# Patient Record
Sex: MALE | Race: WHITE
[De-identification: names, ages, dates, MRNs, and addresses within clinical notes are randomized per-mention and may not be internally consistent; named-entity substitution may affect disease eponyms.]

---

## 2018-08-27 ENCOUNTER — HOSPITAL ENCOUNTER (OUTPATIENT)
Dept: HOSPITAL 94 - GI LAB | Age: 58
Discharge: HOME | End: 2018-08-27
Attending: INTERNAL MEDICINE
Payer: MEDICAID

## 2018-08-27 VITALS — DIASTOLIC BLOOD PRESSURE: 87 MMHG | SYSTOLIC BLOOD PRESSURE: 139 MMHG

## 2018-08-27 VITALS — DIASTOLIC BLOOD PRESSURE: 88 MMHG | SYSTOLIC BLOOD PRESSURE: 146 MMHG

## 2018-08-27 VITALS — DIASTOLIC BLOOD PRESSURE: 74 MMHG | SYSTOLIC BLOOD PRESSURE: 113 MMHG

## 2018-08-27 VITALS — HEIGHT: 68 IN | WEIGHT: 171.37 LBS | BODY MASS INDEX: 25.97 KG/M2

## 2018-08-27 VITALS — DIASTOLIC BLOOD PRESSURE: 78 MMHG | SYSTOLIC BLOOD PRESSURE: 126 MMHG

## 2018-08-27 VITALS — DIASTOLIC BLOOD PRESSURE: 90 MMHG | SYSTOLIC BLOOD PRESSURE: 137 MMHG

## 2018-08-27 DIAGNOSIS — B37.81: Primary | ICD-10-CM

## 2018-08-27 DIAGNOSIS — K76.6: ICD-10-CM

## 2018-08-27 DIAGNOSIS — I85.00: ICD-10-CM

## 2018-08-27 DIAGNOSIS — Z79.899: ICD-10-CM

## 2018-08-27 DIAGNOSIS — E11.9: ICD-10-CM

## 2018-08-27 DIAGNOSIS — Z79.4: ICD-10-CM

## 2018-08-27 DIAGNOSIS — K31.89: ICD-10-CM

## 2018-08-27 DIAGNOSIS — L40.8: ICD-10-CM

## 2018-08-27 DIAGNOSIS — Z98.890: ICD-10-CM

## 2018-08-27 DIAGNOSIS — Z87.891: ICD-10-CM

## 2018-08-27 DIAGNOSIS — D50.0: ICD-10-CM

## 2018-08-27 PROCEDURE — 99152 MOD SED SAME PHYS/QHP 5/>YRS: CPT

## 2018-08-27 PROCEDURE — 43239 EGD BIOPSY SINGLE/MULTIPLE: CPT

## 2019-03-01 ENCOUNTER — HOSPITAL ENCOUNTER (INPATIENT)
Dept: HOSPITAL 94 - ER | Age: 59
LOS: 9 days | Discharge: LEFT BEFORE DISCHARGE | End: 2019-03-10
Payer: MEDICAID

## 2019-03-01 DIAGNOSIS — K72.00: ICD-10-CM

## 2019-03-01 DIAGNOSIS — L03.90: ICD-10-CM

## 2019-03-01 DIAGNOSIS — K74.60: ICD-10-CM

## 2019-03-01 DIAGNOSIS — A41.9: Primary | ICD-10-CM

## 2019-03-18 ENCOUNTER — HOSPITAL ENCOUNTER (INPATIENT)
Dept: HOSPITAL 94 - ER | Age: 59
LOS: 15 days | Discharge: NURSING, REHAB OR LONG-TERM CARE | DRG: 720 | End: 2019-04-02
Attending: GENERAL PRACTICE | Admitting: SPECIALIST
Payer: MEDICAID

## 2019-03-18 VITALS — BODY MASS INDEX: 25.06 KG/M2 | HEIGHT: 68 IN | WEIGHT: 165.35 LBS

## 2019-03-18 VITALS — SYSTOLIC BLOOD PRESSURE: 115 MMHG | DIASTOLIC BLOOD PRESSURE: 63 MMHG

## 2019-03-18 DIAGNOSIS — K52.9: ICD-10-CM

## 2019-03-18 DIAGNOSIS — Z79.4: ICD-10-CM

## 2019-03-18 DIAGNOSIS — I89.0: ICD-10-CM

## 2019-03-18 DIAGNOSIS — E87.6: ICD-10-CM

## 2019-03-18 DIAGNOSIS — Z60.2: ICD-10-CM

## 2019-03-18 DIAGNOSIS — F10.10: ICD-10-CM

## 2019-03-18 DIAGNOSIS — L03.116: ICD-10-CM

## 2019-03-18 DIAGNOSIS — L03.115: ICD-10-CM

## 2019-03-18 DIAGNOSIS — Z87.891: ICD-10-CM

## 2019-03-18 DIAGNOSIS — M99.06: ICD-10-CM

## 2019-03-18 DIAGNOSIS — R14.0: ICD-10-CM

## 2019-03-18 DIAGNOSIS — Z59.0: ICD-10-CM

## 2019-03-18 DIAGNOSIS — Z51.5: ICD-10-CM

## 2019-03-18 DIAGNOSIS — D69.6: ICD-10-CM

## 2019-03-18 DIAGNOSIS — K42.9: ICD-10-CM

## 2019-03-18 DIAGNOSIS — B19.20: ICD-10-CM

## 2019-03-18 DIAGNOSIS — K70.31: ICD-10-CM

## 2019-03-18 DIAGNOSIS — K72.90: ICD-10-CM

## 2019-03-18 DIAGNOSIS — E11.9: ICD-10-CM

## 2019-03-18 DIAGNOSIS — A41.51: Primary | ICD-10-CM

## 2019-03-18 DIAGNOSIS — B35.1: ICD-10-CM

## 2019-03-18 LAB
ALBUMIN SERPL BCP-MCNC: 1.5 G/DL (ref 3.4–5)
ALBUMIN/GLOB SERPL: 0.3 {RATIO} (ref 1.1–1.5)
ALP SERPL-CCNC: 125 IU/L (ref 46–116)
ALT SERPL W P-5'-P-CCNC: 54 U/L (ref 12–78)
ANION GAP SERPL CALCULATED.3IONS-SCNC: 8 MMOL/L (ref 8–16)
APTT PPP: 40 SECONDS (ref 22–32)
AST SERPL W P-5'-P-CCNC: 67 U/L (ref 10–37)
BASOPHILS # BLD AUTO: 0 X10'3 (ref 0–0.2)
BASOPHILS NFR BLD AUTO: 0.2 % (ref 0–1)
BILIRUB SERPL-MCNC: 5.9 MG/DL (ref 0.1–1)
BUN SERPL-MCNC: 12 MG/DL (ref 7–18)
BUN/CREAT SERPL: 14.1 (ref 5.4–32)
CALCIUM SERPL-MCNC: 7.3 MG/DL (ref 8.5–10.1)
CHLORIDE SERPL-SCNC: 101 MMOL/L (ref 99–107)
CO2 SERPL-SCNC: 20.7 MMOL/L (ref 24–32)
CREAT SERPL-MCNC: 0.85 MG/DL (ref 0.6–1.1)
EOSINOPHIL # BLD AUTO: 0 X10'3 (ref 0–0.9)
EOSINOPHIL NFR BLD AUTO: 0.1 % (ref 0–6)
ERYTHROCYTE [DISTWIDTH] IN BLOOD BY AUTOMATED COUNT: 17.3 % (ref 11.5–14.5)
GFR SERPL CREATININE-BSD FRML MDRD: > 90 ML/MIN
GLUCOSE SERPL-MCNC: 122 MG/DL (ref 70–104)
HCT VFR BLD AUTO: 31 % (ref 42–52)
HGB BLD-MCNC: 10.9 G/DL (ref 14–17.9)
INR PPP: 2 INR
LYMPHOCYTES # BLD AUTO: 0.6 X10'3 (ref 1.1–4.8)
LYMPHOCYTES NFR BLD AUTO: 8 % (ref 21–51)
MCH RBC QN AUTO: 36.5 PG (ref 27–31)
MCHC RBC AUTO-ENTMCNC: 35.1 G/DL (ref 33–36.5)
MCV RBC AUTO: 103.8 FL (ref 78–98)
MONOCYTES # BLD AUTO: 0.2 X10'3 (ref 0–0.9)
MONOCYTES NFR BLD AUTO: 3.3 % (ref 2–12)
NEUTROPHILS # BLD AUTO: 6.1 X10'3 (ref 1.8–7.7)
NEUTROPHILS NFR BLD AUTO: 88.4 % (ref 42–75)
PLATELET # BLD AUTO: 106 X10'3 (ref 140–440)
PMV BLD AUTO: 8 FL (ref 7.4–10.4)
POTASSIUM SERPL-SCNC: 3.1 MMOL/L (ref 3.5–5.1)
PROT SERPL-MCNC: 6.1 G/DL (ref 6.4–8.2)
PROTHROMBIN TIME: 19.7 SECONDS (ref 9–12)
RBC # BLD AUTO: 2.98 X10'6 (ref 4.7–6.1)
SODIUM SERPL-SCNC: 130 MMOL/L (ref 135–145)
WBC # BLD AUTO: 6.9 X10'3 (ref 4.5–11)

## 2019-03-18 PROCEDURE — 86703 HIV-1/HIV-2 1 RESULT ANTBDY: CPT

## 2019-03-18 PROCEDURE — 87070 CULTURE OTHR SPECIMN AEROBIC: CPT

## 2019-03-18 PROCEDURE — 80074 ACUTE HEPATITIS PANEL: CPT

## 2019-03-18 PROCEDURE — 96361 HYDRATE IV INFUSION ADD-ON: CPT

## 2019-03-18 PROCEDURE — 87040 BLOOD CULTURE FOR BACTERIA: CPT

## 2019-03-18 PROCEDURE — 97530 THERAPEUTIC ACTIVITIES: CPT

## 2019-03-18 PROCEDURE — 85730 THROMBOPLASTIN TIME PARTIAL: CPT

## 2019-03-18 PROCEDURE — 97116 GAIT TRAINING THERAPY: CPT

## 2019-03-18 PROCEDURE — 96365 THER/PROPH/DIAG IV INF INIT: CPT

## 2019-03-18 PROCEDURE — 83605 ASSAY OF LACTIC ACID: CPT

## 2019-03-18 PROCEDURE — 76705 ECHO EXAM OF ABDOMEN: CPT

## 2019-03-18 PROCEDURE — 71045 X-RAY EXAM CHEST 1 VIEW: CPT

## 2019-03-18 PROCEDURE — 74177 CT ABD & PELVIS W/CONTRAST: CPT

## 2019-03-18 PROCEDURE — 93306 TTE W/DOPPLER COMPLETE: CPT

## 2019-03-18 PROCEDURE — 76700 US EXAM ABDOM COMPLETE: CPT

## 2019-03-18 PROCEDURE — 82948 REAGENT STRIP/BLOOD GLUCOSE: CPT

## 2019-03-18 PROCEDURE — 85610 PROTHROMBIN TIME: CPT

## 2019-03-18 PROCEDURE — 85025 COMPLETE CBC W/AUTO DIFF WBC: CPT

## 2019-03-18 PROCEDURE — 99285 EMERGENCY DEPT VISIT HI MDM: CPT

## 2019-03-18 PROCEDURE — 82378 CARCINOEMBRYONIC ANTIGEN: CPT

## 2019-03-18 PROCEDURE — 84145 PROCALCITONIN (PCT): CPT

## 2019-03-18 PROCEDURE — 87186 SC STD MICRODIL/AGAR DIL: CPT

## 2019-03-18 PROCEDURE — 97161 PT EVAL LOW COMPLEX 20 MIN: CPT

## 2019-03-18 PROCEDURE — 36415 COLL VENOUS BLD VENIPUNCTURE: CPT

## 2019-03-18 PROCEDURE — 80053 COMPREHEN METABOLIC PANEL: CPT

## 2019-03-18 PROCEDURE — 82140 ASSAY OF AMMONIA: CPT

## 2019-03-18 PROCEDURE — 87077 CULTURE AEROBIC IDENTIFY: CPT

## 2019-03-18 RX ADMIN — Medication SCH MLS/HR: at 23:23

## 2019-03-18 SDOH — ECONOMIC STABILITY - HOUSING INSECURITY: HOMELESSNESS: Z59.0

## 2019-03-18 SDOH — SOCIAL STABILITY - SOCIAL INSECURITY: PROBLEMS RELATED TO LIVING ALONE: Z60.2

## 2019-03-18 NOTE — NUR
PATIENT HAD ELEVATED LACTIC ACID, VERBALIZED TO DR. JAMIL THAT THE NS BOLUS 
WAS GOING SLOW. DR. JAMIL VERBALIZED THAT THIS IS GOING TO BE FINE THAT WE 
NEED TO SAVE IV DUE TO PATIENT BEING HARD STICK, AND THAT THE LEVEL OF SEPSIS 
DR. JAMIL IS NOT CONCERNED FOR THE 30mL PER KG.

## 2019-03-18 NOTE — NUR
TO ER WITH C/O LOWER EXTREM EDEMA AND PAIN. 3-4+ EDEMA NOTED TO LOWER EXTREMS. 
REDNESS AND EXCORIATION TO RIGHT LOWER LEG AND RIGHT THIGH. DIFFICULTY MOVING 
AROUND. POOR HISTORIAN[place]

## 2019-03-19 VITALS — DIASTOLIC BLOOD PRESSURE: 57 MMHG | SYSTOLIC BLOOD PRESSURE: 107 MMHG

## 2019-03-19 VITALS — DIASTOLIC BLOOD PRESSURE: 59 MMHG | SYSTOLIC BLOOD PRESSURE: 103 MMHG

## 2019-03-19 VITALS — SYSTOLIC BLOOD PRESSURE: 126 MMHG | DIASTOLIC BLOOD PRESSURE: 62 MMHG

## 2019-03-19 LAB
ALBUMIN SERPL BCP-MCNC: 1.2 G/DL (ref 3.4–5)
ALBUMIN/GLOB SERPL: 0.3 {RATIO} (ref 1.1–1.5)
ALP SERPL-CCNC: 99 IU/L (ref 46–116)
ALT SERPL W P-5'-P-CCNC: 42 U/L (ref 12–78)
ANION GAP SERPL CALCULATED.3IONS-SCNC: 9 MMOL/L (ref 8–16)
AST SERPL W P-5'-P-CCNC: 50 U/L (ref 10–37)
BASOPHILS # BLD AUTO: 0 X10'3 (ref 0–0.2)
BASOPHILS NFR BLD AUTO: 0.2 % (ref 0–1)
BILIRUB SERPL-MCNC: 4.9 MG/DL (ref 0.1–1)
BUN SERPL-MCNC: 15 MG/DL (ref 7–18)
BUN/CREAT SERPL: 17.4 (ref 5.4–32)
CALCIUM SERPL-MCNC: 7.2 MG/DL (ref 8.5–10.1)
CHLORIDE SERPL-SCNC: 103 MMOL/L (ref 99–107)
CO2 SERPL-SCNC: 21 MMOL/L (ref 24–32)
CREAT SERPL-MCNC: 0.86 MG/DL (ref 0.6–1.1)
EOSINOPHIL # BLD AUTO: 0.1 X10'3 (ref 0–0.9)
EOSINOPHIL NFR BLD AUTO: 1 % (ref 0–6)
ERYTHROCYTE [DISTWIDTH] IN BLOOD BY AUTOMATED COUNT: 17.6 % (ref 11.5–14.5)
GFR SERPL CREATININE-BSD FRML MDRD: > 90 ML/MIN
GLUCOSE SERPL-MCNC: 132 MG/DL (ref 70–104)
HCT VFR BLD AUTO: 27.6 % (ref 42–52)
HGB BLD-MCNC: 9.8 G/DL (ref 14–17.9)
HIV1+2 AB SPEC QL IA.RAPID: (no result)
LYMPHOCYTES # BLD AUTO: 0.8 X10'3 (ref 1.1–4.8)
LYMPHOCYTES NFR BLD AUTO: 13.2 % (ref 21–51)
MCH RBC QN AUTO: 37 PG (ref 27–31)
MCHC RBC AUTO-ENTMCNC: 35.6 G/DL (ref 33–36.5)
MCV RBC AUTO: 104.1 FL (ref 78–98)
MONOCYTES # BLD AUTO: 0.4 X10'3 (ref 0–0.9)
MONOCYTES NFR BLD AUTO: 6.6 % (ref 2–12)
NEUTROPHILS # BLD AUTO: 4.9 X10'3 (ref 1.8–7.7)
NEUTROPHILS NFR BLD AUTO: 79 % (ref 42–75)
PLATELET # BLD AUTO: 85 X10'3 (ref 140–440)
PMV BLD AUTO: 8 FL (ref 7.4–10.4)
POTASSIUM SERPL-SCNC: 3.1 MMOL/L (ref 3.5–5.1)
PROT SERPL-MCNC: 5.1 G/DL (ref 6.4–8.2)
RBC # BLD AUTO: 2.65 X10'6 (ref 4.7–6.1)
SODIUM SERPL-SCNC: 133 MMOL/L (ref 135–145)
WBC # BLD AUTO: 6.2 X10'3 (ref 4.5–11)

## 2019-03-19 PROCEDURE — BW211ZZ COMPUTERIZED TOMOGRAPHY (CT SCAN) OF ABDOMEN AND PELVIS USING LOW OSMOLAR CONTRAST: ICD-10-PCS | Performed by: RADIOLOGY

## 2019-03-19 RX ADMIN — MORPHINE SULFATE PRN MG: 4 INJECTION, SOLUTION INTRAMUSCULAR; INTRAVENOUS at 19:38

## 2019-03-19 RX ADMIN — SODIUM CHLORIDE SCH GM: 0.9 IRRIGANT IRRIGATION at 19:27

## 2019-03-19 RX ADMIN — SODIUM CHLORIDE SCH GM: 0.9 IRRIGANT IRRIGATION at 14:00

## 2019-03-19 RX ADMIN — MORPHINE SULFATE PRN MG: 4 INJECTION, SOLUTION INTRAMUSCULAR; INTRAVENOUS at 07:35

## 2019-03-19 RX ADMIN — Medication SCH MMU: at 19:28

## 2019-03-19 RX ADMIN — POTASSIUM CHLORIDE PRN MEQ: 1500 TABLET, EXTENDED RELEASE ORAL at 11:47

## 2019-03-19 RX ADMIN — CEFEPIME SCH MLS/HR: 1 INJECTION, SOLUTION INTRAVENOUS at 14:00

## 2019-03-19 RX ADMIN — CEFEPIME SCH MLS/HR: 1 INJECTION, SOLUTION INTRAVENOUS at 19:28

## 2019-03-19 RX ADMIN — POTASSIUM CHLORIDE PRN MEQ: 1500 TABLET, EXTENDED RELEASE ORAL at 16:55

## 2019-03-19 RX ADMIN — INSULIN GLARGINE SCH UNIT: 100 INJECTION, SOLUTION SUBCUTANEOUS at 20:56

## 2019-03-19 RX ADMIN — SODIUM CHLORIDE SCH GM: 0.9 IRRIGANT IRRIGATION at 20:00

## 2019-03-19 RX ADMIN — MORPHINE SULFATE PRN MG: 4 INJECTION, SOLUTION INTRAMUSCULAR; INTRAVENOUS at 03:14

## 2019-03-19 RX ADMIN — MORPHINE SULFATE PRN MG: 4 INJECTION, SOLUTION INTRAMUSCULAR; INTRAVENOUS at 12:08

## 2019-03-19 RX ADMIN — Medication SCH MLS/HR: at 07:34

## 2019-03-19 NOTE — NUR
Problems reprioritized. Patient report given, questions answered & plan of care reviewed 
with Chyna KELLY.

## 2019-03-19 NOTE — NUR
MD paged regarding positive blood culture in anareobic bottle, gram negative rods. Awaiting 
call back.

## 2019-03-19 NOTE — NUR
Extended PIV inserted to the left upper arm basilic vein x 1 attempt ussing ultrasound.  Michael 
well.

-------------------------------------------------------------------------------

Addendum: 03/19/19 at 1556 by Sandra Calderon RN

-------------------------------------------------------------------------------

Amended: Links added.

## 2019-03-19 NOTE — NUR
Patient in room [facility] 357. I have received report from  Chyna KELLY  and had the opportunity to 
ask questions and assume patient care.

## 2019-03-19 NOTE — NUR
Dr. Christian informed of positive blood culture from IV 3/18 gram negative rods. No new 
orders. Also informed MD of K 3.1, MD stated to replace per protocol.

## 2019-03-19 NOTE — NUR
Problems reprioritized. Patient report given, questions answered & plan of care reviewed 
with ROBIN Erazo. 

-------------------------------------------------------------------------------

Addendum: 03/19/19 at 0632 by Chyna Bosch RN

-------------------------------------------------------------------------------

Amended: Links added.

## 2019-03-20 VITALS — SYSTOLIC BLOOD PRESSURE: 123 MMHG | DIASTOLIC BLOOD PRESSURE: 75 MMHG

## 2019-03-20 VITALS — SYSTOLIC BLOOD PRESSURE: 106 MMHG | DIASTOLIC BLOOD PRESSURE: 65 MMHG

## 2019-03-20 VITALS — SYSTOLIC BLOOD PRESSURE: 123 MMHG | DIASTOLIC BLOOD PRESSURE: 58 MMHG

## 2019-03-20 VITALS — SYSTOLIC BLOOD PRESSURE: 128 MMHG | DIASTOLIC BLOOD PRESSURE: 69 MMHG

## 2019-03-20 LAB
ALBUMIN SERPL BCP-MCNC: 1.2 G/DL (ref 3.4–5)
ALBUMIN/GLOB SERPL: 0.3 {RATIO} (ref 1.1–1.5)
ALP SERPL-CCNC: 95 IU/L (ref 46–116)
ALT SERPL W P-5'-P-CCNC: 37 U/L (ref 12–78)
ANION GAP SERPL CALCULATED.3IONS-SCNC: 6 MMOL/L (ref 8–16)
AST SERPL W P-5'-P-CCNC: 47 U/L (ref 10–37)
BASOPHILS # BLD AUTO: 0 X10'3 (ref 0–0.2)
BASOPHILS NFR BLD AUTO: 0.2 % (ref 0–1)
BILIRUB SERPL-MCNC: 4.1 MG/DL (ref 0.1–1)
BUN SERPL-MCNC: 15 MG/DL (ref 7–18)
BUN/CREAT SERPL: 21.4 (ref 5.4–32)
CALCIUM SERPL-MCNC: 7.1 MG/DL (ref 8.5–10.1)
CHLORIDE SERPL-SCNC: 104 MMOL/L (ref 99–107)
CO2 SERPL-SCNC: 24 MMOL/L (ref 24–32)
CREAT SERPL-MCNC: 0.7 MG/DL (ref 0.6–1.1)
EOSINOPHIL # BLD AUTO: 0.3 X10'3 (ref 0–0.9)
EOSINOPHIL NFR BLD AUTO: 4.2 % (ref 0–6)
ERYTHROCYTE [DISTWIDTH] IN BLOOD BY AUTOMATED COUNT: 17.7 % (ref 11.5–14.5)
GFR SERPL CREATININE-BSD FRML MDRD: > 90 ML/MIN
GLUCOSE SERPL-MCNC: 110 MG/DL (ref 70–104)
HCT VFR BLD AUTO: 27.6 % (ref 42–52)
HGB BLD-MCNC: 9.8 G/DL (ref 14–17.9)
LYMPHOCYTES # BLD AUTO: 1.1 X10'3 (ref 1.1–4.8)
LYMPHOCYTES NFR BLD AUTO: 15.7 % (ref 21–51)
MCH RBC QN AUTO: 37 PG (ref 27–31)
MCHC RBC AUTO-ENTMCNC: 35.7 G/DL (ref 33–36.5)
MCV RBC AUTO: 103.8 FL (ref 78–98)
MONOCYTES # BLD AUTO: 1.1 X10'3 (ref 0–0.9)
MONOCYTES NFR BLD AUTO: 15.8 % (ref 2–12)
NEUTROPHILS # BLD AUTO: 4.5 X10'3 (ref 1.8–7.7)
NEUTROPHILS NFR BLD AUTO: 64.1 % (ref 42–75)
PLATELET # BLD AUTO: 90 X10'3 (ref 140–440)
PMV BLD AUTO: 7.8 FL (ref 7.4–10.4)
POTASSIUM SERPL-SCNC: 3.5 MMOL/L (ref 3.5–5.1)
PROT SERPL-MCNC: 5.2 G/DL (ref 6.4–8.2)
RBC # BLD AUTO: 2.65 X10'6 (ref 4.7–6.1)
SODIUM SERPL-SCNC: 134 MMOL/L (ref 135–145)
WBC # BLD AUTO: 7 X10'3 (ref 4.5–11)

## 2019-03-20 RX ADMIN — INSULIN GLARGINE SCH UNIT: 100 INJECTION, SOLUTION SUBCUTANEOUS at 21:00

## 2019-03-20 RX ADMIN — SODIUM CHLORIDE SCH GM: 0.9 IRRIGANT IRRIGATION at 14:01

## 2019-03-20 RX ADMIN — CEFEPIME SCH MLS/HR: 1 INJECTION, SOLUTION INTRAVENOUS at 03:39

## 2019-03-20 RX ADMIN — MORPHINE SULFATE PRN MG: 4 INJECTION, SOLUTION INTRAMUSCULAR; INTRAVENOUS at 03:47

## 2019-03-20 RX ADMIN — Medication SCH MMU: at 19:27

## 2019-03-20 RX ADMIN — CEFEPIME SCH MLS/HR: 1 INJECTION, SOLUTION INTRAVENOUS at 19:28

## 2019-03-20 RX ADMIN — SODIUM CHLORIDE SCH GM: 0.9 IRRIGANT IRRIGATION at 19:27

## 2019-03-20 RX ADMIN — MORPHINE SULFATE PRN MG: 4 INJECTION, SOLUTION INTRAMUSCULAR; INTRAVENOUS at 09:49

## 2019-03-20 RX ADMIN — MORPHINE SULFATE PRN MG: 4 INJECTION, SOLUTION INTRAMUSCULAR; INTRAVENOUS at 19:27

## 2019-03-20 RX ADMIN — SODIUM CHLORIDE SCH GM: 0.9 IRRIGANT IRRIGATION at 02:00

## 2019-03-20 RX ADMIN — CEFEPIME SCH MLS/HR: 1 INJECTION, SOLUTION INTRAVENOUS at 12:47

## 2019-03-20 RX ADMIN — RIFAXIMIN SCH MG: 550 TABLET ORAL at 19:27

## 2019-03-20 RX ADMIN — Medication SCH MMU: at 08:46

## 2019-03-20 RX ADMIN — SODIUM CHLORIDE SCH GM: 0.9 IRRIGANT IRRIGATION at 08:45

## 2019-03-20 NOTE — NUR
Patient in room [facility]ONDA 357. I have received report from  ROBIN Clemente  and had the opportunity to 
ask questions and assume patient care.

## 2019-03-20 NOTE — NUR
Problems reprioritized. Patient report given, questions answered & plan of care reviewed 
with ROBIN Beltrán. 

-------------------------------------------------------------------------------

Addendum: 03/20/19 at 0623 by Chyna Bosch RN

-------------------------------------------------------------------------------

Amended: Links added.

## 2019-03-20 NOTE — NUR
Problems reprioritized. Patient report given, questions answered & plan of care reviewed 
with ROBIN Jarvis.

## 2019-03-21 VITALS — SYSTOLIC BLOOD PRESSURE: 124 MMHG | DIASTOLIC BLOOD PRESSURE: 79 MMHG

## 2019-03-21 VITALS — SYSTOLIC BLOOD PRESSURE: 107 MMHG | DIASTOLIC BLOOD PRESSURE: 52 MMHG

## 2019-03-21 VITALS — SYSTOLIC BLOOD PRESSURE: 107 MMHG | DIASTOLIC BLOOD PRESSURE: 68 MMHG

## 2019-03-21 VITALS — SYSTOLIC BLOOD PRESSURE: 124 MMHG | DIASTOLIC BLOOD PRESSURE: 75 MMHG

## 2019-03-21 LAB
ALBUMIN SERPL BCP-MCNC: 1.2 G/DL (ref 3.4–5)
ALBUMIN/GLOB SERPL: 0.3 {RATIO} (ref 1.1–1.5)
ALP SERPL-CCNC: 104 IU/L (ref 46–116)
ALT SERPL W P-5'-P-CCNC: 34 U/L (ref 12–78)
ANION GAP SERPL CALCULATED.3IONS-SCNC: 8 MMOL/L (ref 8–16)
AST SERPL W P-5'-P-CCNC: 53 U/L (ref 10–37)
BASOPHILS # BLD AUTO: 0.1 X10'3 (ref 0–0.2)
BASOPHILS NFR BLD AUTO: 1.1 % (ref 0–1)
BILIRUB SERPL-MCNC: 4.1 MG/DL (ref 0.1–1)
BUN SERPL-MCNC: 11 MG/DL (ref 7–18)
BUN/CREAT SERPL: 16.7 (ref 5.4–32)
CALCIUM SERPL-MCNC: 7.2 MG/DL (ref 8.5–10.1)
CHLORIDE SERPL-SCNC: 101 MMOL/L (ref 99–107)
CO2 SERPL-SCNC: 23.9 MMOL/L (ref 24–32)
CREAT SERPL-MCNC: 0.66 MG/DL (ref 0.6–1.1)
EOSINOPHIL # BLD AUTO: 0.4 X10'3 (ref 0–0.9)
EOSINOPHIL NFR BLD AUTO: 4.5 % (ref 0–6)
ERYTHROCYTE [DISTWIDTH] IN BLOOD BY AUTOMATED COUNT: 17.8 % (ref 11.5–14.5)
GFR SERPL CREATININE-BSD FRML MDRD: > 90 ML/MIN
GLUCOSE SERPL-MCNC: 105 MG/DL (ref 70–104)
HCT VFR BLD AUTO: 29.6 % (ref 42–52)
HEPATITIS C ANTIBODY: >11 S/CO RATIO (ref 0–0.9)
HGB BLD-MCNC: 10.5 G/DL (ref 14–17.9)
LYMPHOCYTES # BLD AUTO: 1.5 X10'3 (ref 1.1–4.8)
LYMPHOCYTES NFR BLD AUTO: 18.3 % (ref 21–51)
MCH RBC QN AUTO: 36.8 PG (ref 27–31)
MCHC RBC AUTO-ENTMCNC: 35.4 G/DL (ref 33–36.5)
MCV RBC AUTO: 104 FL (ref 78–98)
MONOCYTES # BLD AUTO: 1.3 X10'3 (ref 0–0.9)
MONOCYTES NFR BLD AUTO: 15.8 % (ref 2–12)
NEUTROPHILS # BLD AUTO: 4.9 X10'3 (ref 1.8–7.7)
NEUTROPHILS NFR BLD AUTO: 60.3 % (ref 42–75)
PLATELET # BLD AUTO: 87 X10'3 (ref 140–440)
PMV BLD AUTO: 7.8 FL (ref 7.4–10.4)
POTASSIUM SERPL-SCNC: 3.5 MMOL/L (ref 3.5–5.1)
PROT SERPL-MCNC: 5.4 G/DL (ref 6.4–8.2)
RBC # BLD AUTO: 2.84 X10'6 (ref 4.7–6.1)
SODIUM SERPL-SCNC: 133 MMOL/L (ref 135–145)
WBC # BLD AUTO: 8.1 X10'3 (ref 4.5–11)

## 2019-03-21 RX ADMIN — Medication SCH MMU: at 19:42

## 2019-03-21 RX ADMIN — MORPHINE SULFATE PRN MG: 4 INJECTION, SOLUTION INTRAMUSCULAR; INTRAVENOUS at 14:18

## 2019-03-21 RX ADMIN — MORPHINE SULFATE PRN MG: 4 INJECTION, SOLUTION INTRAMUSCULAR; INTRAVENOUS at 01:12

## 2019-03-21 RX ADMIN — MORPHINE SULFATE PRN MG: 4 INJECTION, SOLUTION INTRAMUSCULAR; INTRAVENOUS at 19:01

## 2019-03-21 RX ADMIN — SODIUM CHLORIDE SCH GM: 0.9 IRRIGANT IRRIGATION at 14:18

## 2019-03-21 RX ADMIN — SODIUM CHLORIDE SCH GM: 0.9 IRRIGANT IRRIGATION at 02:36

## 2019-03-21 RX ADMIN — CEFEPIME SCH MLS/HR: 1 INJECTION, SOLUTION INTRAVENOUS at 11:53

## 2019-03-21 RX ADMIN — Medication SCH MMU: at 07:51

## 2019-03-21 RX ADMIN — RIFAXIMIN SCH MG: 550 TABLET ORAL at 07:56

## 2019-03-21 RX ADMIN — INSULIN GLARGINE SCH UNIT: 100 INJECTION, SOLUTION SUBCUTANEOUS at 21:00

## 2019-03-21 RX ADMIN — SODIUM CHLORIDE SCH GM: 0.9 IRRIGANT IRRIGATION at 19:42

## 2019-03-21 RX ADMIN — CEFEPIME SCH MLS/HR: 1 INJECTION, SOLUTION INTRAVENOUS at 19:42

## 2019-03-21 RX ADMIN — RIFAXIMIN SCH MG: 550 TABLET ORAL at 19:42

## 2019-03-21 RX ADMIN — SODIUM CHLORIDE SCH GM: 0.9 IRRIGANT IRRIGATION at 07:51

## 2019-03-21 RX ADMIN — CEFEPIME SCH MLS/HR: 1 INJECTION, SOLUTION INTRAVENOUS at 04:40

## 2019-03-21 NOTE — NUR
Patient in room [facility]ONDA 357. I have received report from  ROBIN Jarvis  and had the opportunity to 
ask questions and assume patient care.

## 2019-03-22 VITALS — DIASTOLIC BLOOD PRESSURE: 77 MMHG | SYSTOLIC BLOOD PRESSURE: 130 MMHG

## 2019-03-22 VITALS — SYSTOLIC BLOOD PRESSURE: 135 MMHG | DIASTOLIC BLOOD PRESSURE: 85 MMHG

## 2019-03-22 VITALS — DIASTOLIC BLOOD PRESSURE: 70 MMHG | SYSTOLIC BLOOD PRESSURE: 111 MMHG

## 2019-03-22 LAB
ALBUMIN SERPL BCP-MCNC: 1.3 G/DL (ref 3.4–5)
ALBUMIN/GLOB SERPL: 0.3 {RATIO} (ref 1.1–1.5)
ALP SERPL-CCNC: 114 IU/L (ref 46–116)
ALT SERPL W P-5'-P-CCNC: 38 U/L (ref 12–78)
ANION GAP SERPL CALCULATED.3IONS-SCNC: 3 MMOL/L (ref 8–16)
AST SERPL W P-5'-P-CCNC: 68 U/L (ref 10–37)
BASOPHILS # BLD AUTO: 0.1 X10'3 (ref 0–0.2)
BASOPHILS NFR BLD AUTO: 1 % (ref 0–1)
BILIRUB SERPL-MCNC: 4.6 MG/DL (ref 0.1–1)
BUN SERPL-MCNC: 9 MG/DL (ref 7–18)
BUN/CREAT SERPL: 12.7 (ref 5.4–32)
CALCIUM SERPL-MCNC: 6.5 MG/DL (ref 8.5–10.1)
CHLORIDE SERPL-SCNC: 103 MMOL/L (ref 99–107)
CO2 SERPL-SCNC: 26.1 MMOL/L (ref 24–32)
CREAT SERPL-MCNC: 0.71 MG/DL (ref 0.6–1.1)
EOSINOPHIL # BLD AUTO: 0.4 X10'3 (ref 0–0.9)
EOSINOPHIL NFR BLD AUTO: 5.1 % (ref 0–6)
ERYTHROCYTE [DISTWIDTH] IN BLOOD BY AUTOMATED COUNT: 18.1 % (ref 11.5–14.5)
GFR SERPL CREATININE-BSD FRML MDRD: > 90 ML/MIN
GLUCOSE SERPL-MCNC: 114 MG/DL (ref 70–104)
HCT VFR BLD AUTO: 31.4 % (ref 42–52)
HGB BLD-MCNC: 10.7 G/DL (ref 14–17.9)
LYMPHOCYTES # BLD AUTO: 1.3 X10'3 (ref 1.1–4.8)
LYMPHOCYTES NFR BLD AUTO: 16.9 % (ref 21–51)
MCH RBC QN AUTO: 35.9 PG (ref 27–31)
MCHC RBC AUTO-ENTMCNC: 34.1 G/DL (ref 33–36.5)
MCV RBC AUTO: 105.2 FL (ref 78–98)
MONOCYTES # BLD AUTO: 1.2 X10'3 (ref 0–0.9)
MONOCYTES NFR BLD AUTO: 15.5 % (ref 2–12)
NEUTROPHILS # BLD AUTO: 4.8 X10'3 (ref 1.8–7.7)
NEUTROPHILS NFR BLD AUTO: 61.5 % (ref 42–75)
PLATELET # BLD AUTO: 92 X10'3 (ref 140–440)
PMV BLD AUTO: 8.2 FL (ref 7.4–10.4)
POTASSIUM SERPL-SCNC: 3.6 MMOL/L (ref 3.5–5.1)
PROT SERPL-MCNC: 5.7 G/DL (ref 6.4–8.2)
RBC # BLD AUTO: 2.99 X10'6 (ref 4.7–6.1)
SODIUM SERPL-SCNC: 132 MMOL/L (ref 135–145)
WBC # BLD AUTO: 7.7 X10'3 (ref 4.5–11)

## 2019-03-22 RX ADMIN — SODIUM CHLORIDE SCH GM: 0.9 IRRIGANT IRRIGATION at 20:41

## 2019-03-22 RX ADMIN — CEFEPIME SCH MLS/HR: 1 INJECTION, SOLUTION INTRAVENOUS at 03:12

## 2019-03-22 RX ADMIN — Medication SCH MMU: at 20:41

## 2019-03-22 RX ADMIN — MORPHINE SULFATE PRN MG: 4 INJECTION, SOLUTION INTRAMUSCULAR; INTRAVENOUS at 19:09

## 2019-03-22 RX ADMIN — SODIUM CHLORIDE SCH GM: 0.9 IRRIGANT IRRIGATION at 02:00

## 2019-03-22 RX ADMIN — SODIUM CHLORIDE SCH GM: 0.9 IRRIGANT IRRIGATION at 13:42

## 2019-03-22 RX ADMIN — MORPHINE SULFATE PRN MG: 4 INJECTION, SOLUTION INTRAMUSCULAR; INTRAVENOUS at 00:48

## 2019-03-22 RX ADMIN — RIFAXIMIN SCH MG: 550 TABLET ORAL at 20:41

## 2019-03-22 RX ADMIN — MORPHINE SULFATE PRN MG: 4 INJECTION, SOLUTION INTRAMUSCULAR; INTRAVENOUS at 13:50

## 2019-03-22 RX ADMIN — MORPHINE SULFATE PRN MG: 4 INJECTION, SOLUTION INTRAMUSCULAR; INTRAVENOUS at 08:13

## 2019-03-22 RX ADMIN — CLINDAMYCIN IN 5 PERCENT DEXTROSE SCH MLS/HR: 18 INJECTION, SOLUTION INTRAVENOUS at 23:48

## 2019-03-22 RX ADMIN — RIFAXIMIN SCH MG: 550 TABLET ORAL at 08:00

## 2019-03-22 RX ADMIN — Medication SCH MMU: at 08:13

## 2019-03-22 RX ADMIN — CLINDAMYCIN IN 5 PERCENT DEXTROSE SCH MLS/HR: 18 INJECTION, SOLUTION INTRAVENOUS at 16:47

## 2019-03-22 RX ADMIN — INSULIN GLARGINE SCH UNIT: 100 INJECTION, SOLUTION SUBCUTANEOUS at 21:00

## 2019-03-22 RX ADMIN — SODIUM CHLORIDE SCH GM: 0.9 IRRIGANT IRRIGATION at 08:13

## 2019-03-22 RX ADMIN — CEFTRIAXONE SCH MLS/HR: 2 INJECTION, SOLUTION INTRAVENOUS at 13:42

## 2019-03-22 NOTE — NUR
Received report from Marva KELLY pt is eating dinner has visitors at bedside, in no apparent 
distress, call light within reach

## 2019-03-22 NOTE — NUR
Patient in room [facility]ONDA 357. I have received report from  JOEL KELLY  and had the opportunity to ask 
questions and assume patient care.

## 2019-03-23 VITALS — DIASTOLIC BLOOD PRESSURE: 72 MMHG | SYSTOLIC BLOOD PRESSURE: 119 MMHG

## 2019-03-23 VITALS — DIASTOLIC BLOOD PRESSURE: 82 MMHG | SYSTOLIC BLOOD PRESSURE: 130 MMHG

## 2019-03-23 VITALS — SYSTOLIC BLOOD PRESSURE: 131 MMHG | DIASTOLIC BLOOD PRESSURE: 76 MMHG

## 2019-03-23 LAB
ALBUMIN SERPL BCP-MCNC: 1.2 G/DL (ref 3.4–5)
ALBUMIN/GLOB SERPL: 0.3 {RATIO} (ref 1.1–1.5)
ALP SERPL-CCNC: 114 IU/L (ref 46–116)
ALT SERPL W P-5'-P-CCNC: 40 U/L (ref 12–78)
ANION GAP SERPL CALCULATED.3IONS-SCNC: 8 MMOL/L (ref 8–16)
ANISOCYTOSIS BLD QL SMEAR: (no result)
AST SERPL W P-5'-P-CCNC: 86 U/L (ref 10–37)
BASOPHILS # BLD AUTO: 0.1 X10'3 (ref 0–0.2)
BASOPHILS NFR BLD AUTO: 0.9 % (ref 0–1)
BASOPHILS NFR BLD MANUAL: 2 % (ref 0–1)
BILIRUB SERPL-MCNC: 3.4 MG/DL (ref 0.1–1)
BUN SERPL-MCNC: 9 MG/DL (ref 7–18)
BUN/CREAT SERPL: 12.7 (ref 5.4–32)
CALCIUM SERPL-MCNC: 7 MG/DL (ref 8.5–10.1)
CHLORIDE SERPL-SCNC: 102 MMOL/L (ref 99–107)
CO2 SERPL-SCNC: 23.3 MMOL/L (ref 24–32)
CREAT SERPL-MCNC: 0.71 MG/DL (ref 0.6–1.1)
EOSINOPHIL # BLD AUTO: 0.4 X10'3 (ref 0–0.9)
EOSINOPHIL NFR BLD AUTO: 5.3 % (ref 0–6)
EOSINOPHIL NFR BLD MANUAL: 3 % (ref 0–6)
ERYTHROCYTE [DISTWIDTH] IN BLOOD BY AUTOMATED COUNT: 17.9 % (ref 11.5–14.5)
GFR SERPL CREATININE-BSD FRML MDRD: > 90 ML/MIN
GLUCOSE SERPL-MCNC: 143 MG/DL (ref 70–104)
HCT VFR BLD AUTO: 31.1 % (ref 42–52)
HGB BLD-MCNC: 11 G/DL (ref 14–17.9)
LYMPHOCYTES # BLD AUTO: 1.3 X10'3 (ref 1.1–4.8)
LYMPHOCYTES NFR BLD AUTO: 16.3 % (ref 21–51)
LYMPHOCYTES NFR BLD MANUAL: 18 % (ref 21–51)
MACROCYTES BLD QL SMEAR: (no result)
MCH RBC QN AUTO: 36.9 PG (ref 27–31)
MCHC RBC AUTO-ENTMCNC: 35.3 G/DL (ref 33–36.5)
MCV RBC AUTO: 104.5 FL (ref 78–98)
METAMYELOCYTES NFR BLD MANUAL: 3 % (ref 0–0)
MONOCYTES # BLD AUTO: 1.3 X10'3 (ref 0–0.9)
MONOCYTES NFR BLD AUTO: 15.6 % (ref 2–12)
MONOCYTES NFR BLD MANUAL: 14 % (ref 2–12)
MYELOCYTES NFR BLD MANUAL: 2 % (ref 0–0)
NEUTROPHILS # BLD AUTO: 5 X10'3 (ref 1.8–7.7)
NEUTROPHILS NFR BLD AUTO: 61.9 % (ref 42–75)
NEUTS BAND # BLD MANUAL: 5 % (ref 0–10)
NEUTS SEG NFR BLD MANUAL: 53 % (ref 42–75)
PLATELET # BLD AUTO: 84 X10'3 (ref 140–440)
PLATELET BLD QL SMEAR: (no result)
PMV BLD AUTO: 7.9 FL (ref 7.4–10.4)
POIKILOCYTOSIS BLD QL SMEAR: (no result)
POLYCHROMASIA BLD QL SMEAR: (no result)
POTASSIUM SERPL-SCNC: 3.9 MMOL/L (ref 3.5–5.1)
PROT SERPL-MCNC: 5.6 G/DL (ref 6.4–8.2)
RBC # BLD AUTO: 2.98 X10'6 (ref 4.7–6.1)
RBC MORPH BLD: (no result)
ROULEAUX BLD QL SMEAR: (no result)
SODIUM SERPL-SCNC: 133 MMOL/L (ref 135–145)
TOTAL CELLS COUNTED FLD: 100
TOXIC GRANULES BLD QL SMEAR: (no result)
WBC # BLD AUTO: 8 X10'3 (ref 4.5–11)

## 2019-03-23 RX ADMIN — SODIUM CHLORIDE SCH GM: 0.9 IRRIGANT IRRIGATION at 01:36

## 2019-03-23 RX ADMIN — RIFAXIMIN SCH MG: 550 TABLET ORAL at 19:58

## 2019-03-23 RX ADMIN — CLINDAMYCIN IN 5 PERCENT DEXTROSE SCH MLS/HR: 18 INJECTION, SOLUTION INTRAVENOUS at 07:42

## 2019-03-23 RX ADMIN — MORPHINE SULFATE PRN MG: 4 INJECTION, SOLUTION INTRAMUSCULAR; INTRAVENOUS at 01:04

## 2019-03-23 RX ADMIN — Medication SCH MMU: at 19:58

## 2019-03-23 RX ADMIN — CLINDAMYCIN IN 5 PERCENT DEXTROSE SCH MLS/HR: 18 INJECTION, SOLUTION INTRAVENOUS at 23:37

## 2019-03-23 RX ADMIN — MORPHINE SULFATE PRN MG: 4 INJECTION, SOLUTION INTRAMUSCULAR; INTRAVENOUS at 19:58

## 2019-03-23 RX ADMIN — INSULIN GLARGINE SCH UNIT: 100 INJECTION, SOLUTION SUBCUTANEOUS at 21:00

## 2019-03-23 RX ADMIN — CEFTRIAXONE SCH MLS/HR: 2 INJECTION, SOLUTION INTRAVENOUS at 08:56

## 2019-03-23 RX ADMIN — RIFAXIMIN SCH MG: 550 TABLET ORAL at 07:42

## 2019-03-23 RX ADMIN — MORPHINE SULFATE PRN MG: 4 INJECTION, SOLUTION INTRAMUSCULAR; INTRAVENOUS at 15:03

## 2019-03-23 RX ADMIN — SODIUM CHLORIDE SCH GM: 0.9 IRRIGANT IRRIGATION at 07:41

## 2019-03-23 RX ADMIN — CLINDAMYCIN IN 5 PERCENT DEXTROSE SCH MLS/HR: 18 INJECTION, SOLUTION INTRAVENOUS at 16:29

## 2019-03-23 RX ADMIN — SODIUM CHLORIDE SCH GM: 0.9 IRRIGANT IRRIGATION at 19:57

## 2019-03-23 RX ADMIN — MORPHINE SULFATE PRN MG: 4 INJECTION, SOLUTION INTRAMUSCULAR; INTRAVENOUS at 10:01

## 2019-03-23 RX ADMIN — SODIUM CHLORIDE SCH GM: 0.9 IRRIGANT IRRIGATION at 15:02

## 2019-03-23 RX ADMIN — MORPHINE SULFATE PRN MG: 4 INJECTION, SOLUTION INTRAMUSCULAR; INTRAVENOUS at 05:29

## 2019-03-23 RX ADMIN — Medication SCH MMU: at 07:41

## 2019-03-23 NOTE — NUR
Initial: Pt admit with anasarca, liver failure and bilat cellulitis. Per 

MD progress notes cellulitis positive blood cultures for Escherichia coli. 

Per MD progress notes pt with hepatic enceph and pt's cognition is 

markedly improved, on Lactulose. Per MD notes pt with thickening of the 

distal colon thought initially to be a rectal mass on CT of 3/1/2019, pt 

with repeat CT thought to possibly be infectious colitis however per 

Infectious Disease pt unlikely to have C.diff. Pt currently on mech soft 

diet with documented PO intake % meeting nutrient needs with 

adequate protein to aid in skin integrity. [facility] 3/22. Will continue to 

follow and make recommendations as appropriate. 

Recommendations:

1) Continue with mech soft diet

2) Wt per rx

-------------------------------------------------------------------------------

Addendum: 03/23/19 at 1442 by Jenny Dawn RD

-------------------------------------------------------------------------------

Amended: Links added.

## 2019-03-23 NOTE — NUR
Received report from Jacqueline KELLY pt is awake on RA getting ready to eat dinner, in no apparent 
distress,

## 2019-03-23 NOTE — NUR
Problems reprioritized. Patient report given, questions answered & plan of care reviewed 
with RALPH KELLY.

## 2019-03-23 NOTE — NUR
Gave report to rik KELLY pt is awake and alert on RA eating a cracker in no apparent 
distress, call light and items of freq use within reach.

## 2019-03-23 NOTE — NUR
Spoke with Dr. Jarquin regarding pain medication for pt he reordered morphine 2mg Q4H due to 
pt requesting continuity of medication.

## 2019-03-24 VITALS — SYSTOLIC BLOOD PRESSURE: 137 MMHG | DIASTOLIC BLOOD PRESSURE: 94 MMHG

## 2019-03-24 VITALS — SYSTOLIC BLOOD PRESSURE: 132 MMHG | DIASTOLIC BLOOD PRESSURE: 82 MMHG

## 2019-03-24 VITALS — SYSTOLIC BLOOD PRESSURE: 142 MMHG | DIASTOLIC BLOOD PRESSURE: 89 MMHG

## 2019-03-24 VITALS — SYSTOLIC BLOOD PRESSURE: 142 MMHG | DIASTOLIC BLOOD PRESSURE: 88 MMHG

## 2019-03-24 LAB
ALBUMIN SERPL BCP-MCNC: 1.3 G/DL (ref 3.4–5)
ALBUMIN/GLOB SERPL: 0.3 {RATIO} (ref 1.1–1.5)
ALP SERPL-CCNC: 125 IU/L (ref 46–116)
ALT SERPL W P-5'-P-CCNC: 43 U/L (ref 12–78)
ANION GAP SERPL CALCULATED.3IONS-SCNC: 5 MMOL/L (ref 8–16)
AST SERPL W P-5'-P-CCNC: 98 U/L (ref 10–37)
BILIRUB SERPL-MCNC: 3.8 MG/DL (ref 0.1–1)
BUN SERPL-MCNC: 11 MG/DL (ref 7–18)
BUN/CREAT SERPL: 16.9 (ref 5.4–32)
CALCIUM SERPL-MCNC: 7.3 MG/DL (ref 8.5–10.1)
CHLORIDE SERPL-SCNC: 102 MMOL/L (ref 99–107)
CO2 SERPL-SCNC: 25.7 MMOL/L (ref 24–32)
CREAT SERPL-MCNC: 0.65 MG/DL (ref 0.6–1.1)
GFR SERPL CREATININE-BSD FRML MDRD: > 90 ML/MIN
GLUCOSE SERPL-MCNC: 115 MG/DL (ref 70–104)
POTASSIUM SERPL-SCNC: 4 MMOL/L (ref 3.5–5.1)
PROT SERPL-MCNC: 6 G/DL (ref 6.4–8.2)
SODIUM SERPL-SCNC: 133 MMOL/L (ref 135–145)

## 2019-03-24 RX ADMIN — SODIUM CHLORIDE SCH GM: 0.9 IRRIGANT IRRIGATION at 01:29

## 2019-03-24 RX ADMIN — Medication SCH MMU: at 09:23

## 2019-03-24 RX ADMIN — INSULIN GLARGINE SCH UNIT: 100 INJECTION, SOLUTION SUBCUTANEOUS at 21:00

## 2019-03-24 RX ADMIN — RIFAXIMIN SCH MG: 550 TABLET ORAL at 09:23

## 2019-03-24 RX ADMIN — SODIUM CHLORIDE SCH GM: 0.9 IRRIGANT IRRIGATION at 14:42

## 2019-03-24 RX ADMIN — RIFAXIMIN SCH MG: 550 TABLET ORAL at 20:07

## 2019-03-24 RX ADMIN — CLINDAMYCIN IN 5 PERCENT DEXTROSE SCH MLS/HR: 18 INJECTION, SOLUTION INTRAVENOUS at 17:11

## 2019-03-24 RX ADMIN — CEFTRIAXONE SCH MLS/HR: 2 INJECTION, SOLUTION INTRAVENOUS at 10:38

## 2019-03-24 RX ADMIN — CLINDAMYCIN IN 5 PERCENT DEXTROSE SCH MLS/HR: 18 INJECTION, SOLUTION INTRAVENOUS at 09:24

## 2019-03-24 RX ADMIN — SODIUM CHLORIDE SCH GM: 0.9 IRRIGANT IRRIGATION at 19:56

## 2019-03-24 RX ADMIN — Medication SCH MMU: at 19:54

## 2019-03-24 NOTE — NUR
Patient in room [facility]ON[facility] 357. I have received report from Corey KELLY and had the opportunity to ask 
questions and assume patient care. Patient eating dinner, will continue to monitor.

## 2019-03-24 NOTE — NUR
Patient in room [facility]ON[facility] 357. I have received report from Hallie KELLY   and had the opportunity to 
ask questions and assume patient care.

## 2019-03-24 NOTE — NUR
Gave report to Corey KELLY pt is awake requesting milk and ice cream, on RA in no apparent 
distress, call light and items of freq use within reach.

## 2019-03-24 NOTE — NUR
Problems reprioritized. Patient report given, questions answered & plan of care reviewed 
with BALAJI KELLY.

## 2019-03-25 VITALS — SYSTOLIC BLOOD PRESSURE: 139 MMHG | DIASTOLIC BLOOD PRESSURE: 89 MMHG

## 2019-03-25 VITALS — DIASTOLIC BLOOD PRESSURE: 79 MMHG | SYSTOLIC BLOOD PRESSURE: 129 MMHG

## 2019-03-25 VITALS — SYSTOLIC BLOOD PRESSURE: 133 MMHG | DIASTOLIC BLOOD PRESSURE: 82 MMHG

## 2019-03-25 VITALS — SYSTOLIC BLOOD PRESSURE: 127 MMHG | DIASTOLIC BLOOD PRESSURE: 79 MMHG

## 2019-03-25 LAB
ALBUMIN SERPL BCP-MCNC: 1.3 G/DL (ref 3.4–5)
ALBUMIN/GLOB SERPL: 0.3 {RATIO} (ref 1.1–1.5)
ALP SERPL-CCNC: 133 IU/L (ref 46–116)
ALT SERPL W P-5'-P-CCNC: 48 U/L (ref 12–78)
ANION GAP SERPL CALCULATED.3IONS-SCNC: 4 MMOL/L (ref 8–16)
ANISOCYTOSIS BLD QL SMEAR: (no result)
AST SERPL W P-5'-P-CCNC: 114 U/L (ref 10–37)
BASOPHILS # BLD AUTO: 0.1 X10'3 (ref 0–0.2)
BASOPHILS NFR BLD AUTO: 0.8 % (ref 0–1)
BILIRUB SERPL-MCNC: 4.2 MG/DL (ref 0.1–1)
BUN SERPL-MCNC: 11 MG/DL (ref 7–18)
BUN/CREAT SERPL: 16.7 (ref 5.4–32)
CALCIUM SERPL-MCNC: 7.3 MG/DL (ref 8.5–10.1)
CHLORIDE SERPL-SCNC: 100 MMOL/L (ref 99–107)
CO2 SERPL-SCNC: 26.7 MMOL/L (ref 24–32)
CREAT SERPL-MCNC: 0.66 MG/DL (ref 0.6–1.1)
EOSINOPHIL # BLD AUTO: 0.2 X10'3 (ref 0–0.9)
EOSINOPHIL NFR BLD AUTO: 2.5 % (ref 0–6)
ERYTHROCYTE [DISTWIDTH] IN BLOOD BY AUTOMATED COUNT: 19.1 % (ref 11.5–14.5)
GFR SERPL CREATININE-BSD FRML MDRD: > 90 ML/MIN
GLUCOSE SERPL-MCNC: 97 MG/DL (ref 70–104)
HCT VFR BLD AUTO: 33.3 % (ref 42–52)
HGB BLD-MCNC: 11.6 G/DL (ref 14–17.9)
INR PPP: 1.8 INR
LYMPHOCYTES # BLD AUTO: 1.3 X10'3 (ref 1.1–4.8)
LYMPHOCYTES NFR BLD AUTO: 15 % (ref 21–51)
MACROCYTES BLD QL SMEAR: (no result)
MCH RBC QN AUTO: 36.4 PG (ref 27–31)
MCHC RBC AUTO-ENTMCNC: 34.7 G/DL (ref 33–36.5)
MCV RBC AUTO: 104.9 FL (ref 78–98)
MONOCYTES # BLD AUTO: 1.1 X10'3 (ref 0–0.9)
MONOCYTES NFR BLD AUTO: 13.2 % (ref 2–12)
NEUTROPHILS # BLD AUTO: 5.9 X10'3 (ref 1.8–7.7)
NEUTROPHILS NFR BLD AUTO: 68.5 % (ref 42–75)
PLATELET # BLD AUTO: 92 X10'3 (ref 140–440)
PLATELET BLD QL SMEAR: (no result)
PMV BLD AUTO: 7.9 FL (ref 7.4–10.4)
POTASSIUM SERPL-SCNC: 4 MMOL/L (ref 3.5–5.1)
PROT SERPL-MCNC: 6.3 G/DL (ref 6.4–8.2)
PROTHROMBIN TIME: 17.8 SECONDS (ref 9–12)
RBC # BLD AUTO: 3.17 X10'6 (ref 4.7–6.1)
RBC MORPH BLD: (no result)
SODIUM SERPL-SCNC: 131 MMOL/L (ref 135–145)
WBC # BLD AUTO: 8.7 X10'3 (ref 4.5–11)

## 2019-03-25 RX ADMIN — CLINDAMYCIN IN 5 PERCENT DEXTROSE SCH MLS/HR: 18 INJECTION, SOLUTION INTRAVENOUS at 23:54

## 2019-03-25 RX ADMIN — SODIUM CHLORIDE SCH GM: 0.9 IRRIGANT IRRIGATION at 20:00

## 2019-03-25 RX ADMIN — CLINDAMYCIN IN 5 PERCENT DEXTROSE SCH MLS/HR: 18 INJECTION, SOLUTION INTRAVENOUS at 16:15

## 2019-03-25 RX ADMIN — RIFAXIMIN SCH MG: 550 TABLET ORAL at 08:04

## 2019-03-25 RX ADMIN — CLINDAMYCIN IN 5 PERCENT DEXTROSE SCH MLS/HR: 18 INJECTION, SOLUTION INTRAVENOUS at 09:19

## 2019-03-25 RX ADMIN — Medication SCH MMU: at 08:04

## 2019-03-25 RX ADMIN — RIFAXIMIN SCH MG: 550 TABLET ORAL at 19:58

## 2019-03-25 RX ADMIN — Medication SCH MMU: at 19:58

## 2019-03-25 RX ADMIN — SODIUM CHLORIDE SCH GM: 0.9 IRRIGANT IRRIGATION at 02:36

## 2019-03-25 RX ADMIN — SODIUM CHLORIDE SCH GM: 0.9 IRRIGANT IRRIGATION at 08:04

## 2019-03-25 RX ADMIN — INSULIN GLARGINE SCH UNIT: 100 INJECTION, SOLUTION SUBCUTANEOUS at 21:00

## 2019-03-25 RX ADMIN — CEFTRIAXONE SCH MLS/HR: 2 INJECTION, SOLUTION INTRAVENOUS at 08:06

## 2019-03-25 RX ADMIN — SODIUM CHLORIDE SCH GM: 0.9 IRRIGANT IRRIGATION at 14:15

## 2019-03-25 RX ADMIN — CLINDAMYCIN IN 5 PERCENT DEXTROSE SCH MLS/HR: 18 INJECTION, SOLUTION INTRAVENOUS at 00:25

## 2019-03-25 NOTE — NUR
Patient in room [facility]ON[facility] 357. I have received report from ROBIN Beltrán and had the opportunity to 
ask questions and assume patient care.

-------------------------------------------------------------------------------

Addendum: 03/25/19 at 1952 by Chyna Bosch RN

-------------------------------------------------------------------------------

Amended: Links added.

## 2019-03-25 NOTE — NUR
Problems reprioritized. Patient report given, questions answered & plan of care reviewed 
with Leigh Ann RN. Patient resting eyes closed respirations even.

## 2019-03-25 NOTE — NUR
Problems reprioritized. Patient report given, questions answered & plan of care reviewed 
with ROBIN Clemente.

## 2019-03-25 NOTE — NUR
Patient in room [facility]ON[facility] 357. I have received report from ROBIN Reyes  and had the opportunity to 
ask questions and assume patient care.

-------------------------------------------------------------------------------

Addendum: 03/25/19 at 1846 by Leigh Ann Morin RN

-------------------------------------------------------------------------------

ROBIN Hickman

## 2019-03-26 VITALS — SYSTOLIC BLOOD PRESSURE: 124 MMHG | DIASTOLIC BLOOD PRESSURE: 83 MMHG

## 2019-03-26 VITALS — DIASTOLIC BLOOD PRESSURE: 82 MMHG | SYSTOLIC BLOOD PRESSURE: 146 MMHG

## 2019-03-26 VITALS — SYSTOLIC BLOOD PRESSURE: 131 MMHG | DIASTOLIC BLOOD PRESSURE: 71 MMHG

## 2019-03-26 VITALS — SYSTOLIC BLOOD PRESSURE: 130 MMHG | DIASTOLIC BLOOD PRESSURE: 80 MMHG

## 2019-03-26 LAB
ALBUMIN SERPL BCP-MCNC: 1.2 G/DL (ref 3.4–5)
ALBUMIN/GLOB SERPL: 0.3 {RATIO} (ref 1.1–1.5)
ALP SERPL-CCNC: 125 IU/L (ref 46–116)
ALT SERPL W P-5'-P-CCNC: 45 U/L (ref 12–78)
ANION GAP SERPL CALCULATED.3IONS-SCNC: 4 MMOL/L (ref 8–16)
AST SERPL W P-5'-P-CCNC: 103 U/L (ref 10–37)
BILIRUB SERPL-MCNC: 3.2 MG/DL (ref 0.1–1)
BUN SERPL-MCNC: 12 MG/DL (ref 7–18)
BUN/CREAT SERPL: 18.5 (ref 5.4–32)
CALCIUM SERPL-MCNC: 7 MG/DL (ref 8.5–10.1)
CHLORIDE SERPL-SCNC: 101 MMOL/L (ref 99–107)
CO2 SERPL-SCNC: 25.8 MMOL/L (ref 24–32)
CREAT SERPL-MCNC: 0.65 MG/DL (ref 0.6–1.1)
GFR SERPL CREATININE-BSD FRML MDRD: > 90 ML/MIN
GLUCOSE SERPL-MCNC: 109 MG/DL (ref 70–104)
POTASSIUM SERPL-SCNC: 3.8 MMOL/L (ref 3.5–5.1)
PROT SERPL-MCNC: 5.7 G/DL (ref 6.4–8.2)
SODIUM SERPL-SCNC: 131 MMOL/L (ref 135–145)

## 2019-03-26 RX ADMIN — Medication SCH MMU: at 19:31

## 2019-03-26 RX ADMIN — CLINDAMYCIN IN 5 PERCENT DEXTROSE SCH MLS/HR: 18 INJECTION, SOLUTION INTRAVENOUS at 23:23

## 2019-03-26 RX ADMIN — CLINDAMYCIN IN 5 PERCENT DEXTROSE SCH MLS/HR: 18 INJECTION, SOLUTION INTRAVENOUS at 15:46

## 2019-03-26 RX ADMIN — Medication SCH MMU: at 07:46

## 2019-03-26 RX ADMIN — INSULIN GLARGINE SCH UNIT: 100 INJECTION, SOLUTION SUBCUTANEOUS at 19:35

## 2019-03-26 RX ADMIN — SODIUM CHLORIDE SCH GM: 0.9 IRRIGANT IRRIGATION at 02:00

## 2019-03-26 RX ADMIN — CEFTRIAXONE SCH MLS/HR: 2 INJECTION, SOLUTION INTRAVENOUS at 08:58

## 2019-03-26 RX ADMIN — SODIUM CHLORIDE SCH GM: 0.9 IRRIGANT IRRIGATION at 19:35

## 2019-03-26 RX ADMIN — OXYCODONE PRN MG: 5 TABLET ORAL at 20:22

## 2019-03-26 RX ADMIN — RIFAXIMIN SCH MG: 550 TABLET ORAL at 19:31

## 2019-03-26 RX ADMIN — OXYCODONE PRN MG: 5 TABLET ORAL at 16:23

## 2019-03-26 RX ADMIN — RIFAXIMIN SCH MG: 550 TABLET ORAL at 07:46

## 2019-03-26 RX ADMIN — CLINDAMYCIN IN 5 PERCENT DEXTROSE SCH MLS/HR: 18 INJECTION, SOLUTION INTRAVENOUS at 07:46

## 2019-03-26 RX ADMIN — SODIUM CHLORIDE SCH GM: 0.9 IRRIGANT IRRIGATION at 14:23

## 2019-03-26 RX ADMIN — SODIUM CHLORIDE SCH GM: 0.9 IRRIGANT IRRIGATION at 07:47

## 2019-03-26 NOTE — NUR
Patient in room [facility]ON[facility] 357. I have received report from ROBIN Erazo and had the opportunity to 
ask questions and assume patient care.

-------------------------------------------------------------------------------

Addendum: 03/26/19 at 2147 by Chyna Bosch RN

-------------------------------------------------------------------------------

Amended: Links added.

## 2019-03-26 NOTE — NUR
Problems reprioritized. Patient report given, questions answered & plan of care reviewed 
with ROBIN Erazo. 

-------------------------------------------------------------------------------

Addendum: 03/26/19 at 0615 by Chyna Bosch RN

-------------------------------------------------------------------------------

Amended: Links added.

## 2019-03-26 NOTE — NUR
Student documentation:

I have reviewed and agree with all interventions, assessments performed and documented by 
Kyra Student Nurse.

Student Medication Administration:

For this medication-pass time frame, all medication were reviewed, dispensed, administered 
and documented per hospital policy by Kyra Student Nurse.

## 2019-03-26 NOTE — NUR
Patient in room [facility] 357. I have received report from  Kaykay KELLY  and had the opportunity to 
ask questions and assume patient care.

## 2019-03-27 VITALS — DIASTOLIC BLOOD PRESSURE: 73 MMHG | SYSTOLIC BLOOD PRESSURE: 116 MMHG

## 2019-03-27 VITALS — DIASTOLIC BLOOD PRESSURE: 64 MMHG | SYSTOLIC BLOOD PRESSURE: 124 MMHG

## 2019-03-27 VITALS — SYSTOLIC BLOOD PRESSURE: 130 MMHG | DIASTOLIC BLOOD PRESSURE: 67 MMHG

## 2019-03-27 VITALS — DIASTOLIC BLOOD PRESSURE: 83 MMHG | SYSTOLIC BLOOD PRESSURE: 125 MMHG

## 2019-03-27 RX ADMIN — RIFAXIMIN SCH MG: 550 TABLET ORAL at 20:04

## 2019-03-27 RX ADMIN — CLINDAMYCIN IN 5 PERCENT DEXTROSE SCH MLS/HR: 18 INJECTION, SOLUTION INTRAVENOUS at 16:08

## 2019-03-27 RX ADMIN — SODIUM CHLORIDE SCH GM: 0.9 IRRIGANT IRRIGATION at 02:00

## 2019-03-27 RX ADMIN — SODIUM CHLORIDE SCH GM: 0.9 IRRIGANT IRRIGATION at 07:20

## 2019-03-27 RX ADMIN — Medication SCH MMU: at 07:20

## 2019-03-27 RX ADMIN — OXYCODONE PRN MG: 5 TABLET ORAL at 20:05

## 2019-03-27 RX ADMIN — OXYCODONE PRN MG: 5 TABLET ORAL at 02:19

## 2019-03-27 RX ADMIN — OXYCODONE PRN MG: 5 TABLET ORAL at 16:07

## 2019-03-27 RX ADMIN — CEFTRIAXONE SCH MLS/HR: 2 INJECTION, SOLUTION INTRAVENOUS at 08:26

## 2019-03-27 RX ADMIN — SODIUM CHLORIDE SCH GM: 0.9 IRRIGANT IRRIGATION at 20:05

## 2019-03-27 RX ADMIN — OXYCODONE PRN MG: 5 TABLET ORAL at 07:20

## 2019-03-27 RX ADMIN — INSULIN GLARGINE SCH UNIT: 100 INJECTION, SOLUTION SUBCUTANEOUS at 20:27

## 2019-03-27 RX ADMIN — Medication SCH MMU: at 20:05

## 2019-03-27 RX ADMIN — CLINDAMYCIN IN 5 PERCENT DEXTROSE SCH MLS/HR: 18 INJECTION, SOLUTION INTRAVENOUS at 07:20

## 2019-03-27 RX ADMIN — OXYCODONE PRN MG: 5 TABLET ORAL at 11:21

## 2019-03-27 RX ADMIN — RIFAXIMIN SCH MG: 550 TABLET ORAL at 07:20

## 2019-03-27 RX ADMIN — SODIUM CHLORIDE SCH GM: 0.9 IRRIGANT IRRIGATION at 13:23

## 2019-03-27 NOTE — NUR
Problems reprioritized. Patient report given, questions answered & plan of care reviewed 
with ROBIN Erazo. 

-------------------------------------------------------------------------------

Addendum: 03/27/19 at 0616 by Chyna Bosch RN

-------------------------------------------------------------------------------

Amended: Links added.

## 2019-03-27 NOTE — NUR
Problems reprioritized. Patient report given, questions answered & plan of care reviewed 
with Hortensia KELLY.

## 2019-03-28 VITALS — SYSTOLIC BLOOD PRESSURE: 117 MMHG | DIASTOLIC BLOOD PRESSURE: 69 MMHG

## 2019-03-28 VITALS — SYSTOLIC BLOOD PRESSURE: 96 MMHG | DIASTOLIC BLOOD PRESSURE: 52 MMHG

## 2019-03-28 VITALS — SYSTOLIC BLOOD PRESSURE: 131 MMHG | DIASTOLIC BLOOD PRESSURE: 81 MMHG

## 2019-03-28 VITALS — SYSTOLIC BLOOD PRESSURE: 129 MMHG | DIASTOLIC BLOOD PRESSURE: 76 MMHG

## 2019-03-28 LAB
ALBUMIN SERPL BCP-MCNC: 1.1 G/DL (ref 3.4–5)
ALBUMIN/GLOB SERPL: 0.2 {RATIO} (ref 1.1–1.5)
ALP SERPL-CCNC: 135 IU/L (ref 46–116)
ALT SERPL W P-5'-P-CCNC: 49 U/L (ref 12–78)
ANION GAP SERPL CALCULATED.3IONS-SCNC: 5 MMOL/L (ref 8–16)
ANISOCYTOSIS BLD QL SMEAR: (no result)
AST SERPL W P-5'-P-CCNC: 113 U/L (ref 10–37)
BASOPHILS # BLD AUTO: 0.1 X10'3 (ref 0–0.2)
BASOPHILS NFR BLD AUTO: 1.1 % (ref 0–1)
BILIRUB SERPL-MCNC: 2.6 MG/DL (ref 0.1–1)
BUN SERPL-MCNC: 11 MG/DL (ref 7–18)
BUN/CREAT SERPL: 15.9 (ref 5.4–32)
CALCIUM SERPL-MCNC: 6.8 MG/DL (ref 8.5–10.1)
CHLORIDE SERPL-SCNC: 102 MMOL/L (ref 99–107)
CO2 SERPL-SCNC: 27.2 MMOL/L (ref 24–32)
CREAT SERPL-MCNC: 0.69 MG/DL (ref 0.6–1.1)
EOSINOPHIL # BLD AUTO: 0.2 X10'3 (ref 0–0.9)
EOSINOPHIL NFR BLD AUTO: 2.1 % (ref 0–6)
ERYTHROCYTE [DISTWIDTH] IN BLOOD BY AUTOMATED COUNT: 19.6 % (ref 11.5–14.5)
GFR SERPL CREATININE-BSD FRML MDRD: > 90 ML/MIN
GLUCOSE SERPL-MCNC: 150 MG/DL (ref 70–104)
HCT VFR BLD AUTO: 29.1 % (ref 42–52)
HGB BLD-MCNC: 10.1 G/DL (ref 14–17.9)
LYMPHOCYTES # BLD AUTO: 1.5 X10'3 (ref 1.1–4.8)
LYMPHOCYTES NFR BLD AUTO: 20 % (ref 21–51)
MACROCYTES BLD QL SMEAR: (no result)
MCH RBC QN AUTO: 36.9 PG (ref 27–31)
MCHC RBC AUTO-ENTMCNC: 34.8 G/DL (ref 33–36.5)
MCV RBC AUTO: 106 FL (ref 78–98)
MONOCYTES # BLD AUTO: 0.8 X10'3 (ref 0–0.9)
MONOCYTES NFR BLD AUTO: 10 % (ref 2–12)
NEUTROPHILS # BLD AUTO: 5.1 X10'3 (ref 1.8–7.7)
NEUTROPHILS NFR BLD AUTO: 66.8 % (ref 42–75)
PLATELET # BLD AUTO: 96 X10'3 (ref 140–440)
PLATELET BLD QL SMEAR: (no result)
PMV BLD AUTO: 7.9 FL (ref 7.4–10.4)
POTASSIUM SERPL-SCNC: 3.6 MMOL/L (ref 3.5–5.1)
PROT SERPL-MCNC: 5.7 G/DL (ref 6.4–8.2)
RBC # BLD AUTO: 2.75 X10'6 (ref 4.7–6.1)
RBC MORPH BLD: (no result)
SODIUM SERPL-SCNC: 134 MMOL/L (ref 135–145)
WBC # BLD AUTO: 7.7 X10'3 (ref 4.5–11)

## 2019-03-28 RX ADMIN — OXYCODONE PRN MG: 5 TABLET ORAL at 18:24

## 2019-03-28 RX ADMIN — CLINDAMYCIN IN 5 PERCENT DEXTROSE SCH MLS/HR: 18 INJECTION, SOLUTION INTRAVENOUS at 00:02

## 2019-03-28 RX ADMIN — CEFTRIAXONE SCH MLS/HR: 2 INJECTION, SOLUTION INTRAVENOUS at 09:13

## 2019-03-28 RX ADMIN — Medication SCH MMU: at 19:45

## 2019-03-28 RX ADMIN — SODIUM CHLORIDE SCH GM: 0.9 IRRIGANT IRRIGATION at 14:00

## 2019-03-28 RX ADMIN — INSULIN GLARGINE SCH UNIT: 100 INJECTION, SOLUTION SUBCUTANEOUS at 21:00

## 2019-03-28 RX ADMIN — OXYCODONE PRN MG: 5 TABLET ORAL at 08:10

## 2019-03-28 RX ADMIN — OXYCODONE PRN MG: 5 TABLET ORAL at 22:17

## 2019-03-28 RX ADMIN — CLINDAMYCIN IN 5 PERCENT DEXTROSE SCH MLS/HR: 18 INJECTION, SOLUTION INTRAVENOUS at 17:21

## 2019-03-28 RX ADMIN — SODIUM CHLORIDE SCH GM: 0.9 IRRIGANT IRRIGATION at 19:45

## 2019-03-28 RX ADMIN — Medication SCH MMU: at 07:57

## 2019-03-28 RX ADMIN — CLINDAMYCIN IN 5 PERCENT DEXTROSE SCH MLS/HR: 18 INJECTION, SOLUTION INTRAVENOUS at 07:56

## 2019-03-28 RX ADMIN — OXYCODONE PRN MG: 5 TABLET ORAL at 04:14

## 2019-03-28 RX ADMIN — SODIUM CHLORIDE SCH GM: 0.9 IRRIGANT IRRIGATION at 08:00

## 2019-03-28 RX ADMIN — RIFAXIMIN SCH MG: 550 TABLET ORAL at 07:57

## 2019-03-28 RX ADMIN — RIFAXIMIN SCH MG: 550 TABLET ORAL at 19:45

## 2019-03-28 RX ADMIN — SODIUM CHLORIDE SCH GM: 0.9 IRRIGANT IRRIGATION at 01:49

## 2019-03-28 RX ADMIN — OXYCODONE PRN MG: 5 TABLET ORAL at 14:03

## 2019-03-28 RX ADMIN — OXYCODONE PRN MG: 5 TABLET ORAL at 00:02

## 2019-03-28 NOTE — NUR
Patient in room [facility] 357. I have received report from   Emily KELLY and had the opportunity to 
ask questions and assume patient care.

## 2019-03-28 NOTE — NUR
Patient in room [facility] 357. I have received report from Hortensia KELLY   and had the opportunity to 
ask questions and assume patient care.

## 2019-03-28 NOTE — NUR
Student Medication Administration:

For this medication-pass time frame, all medication were reviewed, dispensed, administered 
and documented per hospital policy by Simona Hargrove [facility].

## 2019-03-28 NOTE — NUR
Problems reprioritized. Patient report given, questions answered & plan of care reviewed 
with Sophia KELLY .

## 2019-03-28 NOTE — NUR
Problems reprioritized. Patient report given, questions answered & plan of care reviewed 
with ROBIN Diaz. 

-------------------------------------------------------------------------------

Addendum: 03/28/19 at 0626 by Marcelle Herrmann RN

-------------------------------------------------------------------------------

Amended: Links added.

## 2019-03-28 NOTE — NUR
reassessment: Pt % mechanical soft diet meeting needs. POLINA d/mark RN for thiamin/folic/MVI 
per MD approval given etoh hx w/ ESLD. Pt positive for hepatitis w/ RLE cellulitis and 
RLE/LLE +3 edema. Poor prognosis w/ possible hospice consideration per MD note. [facility] 3/27. 
Will continue to monitor.

Recommendations:

1) Continue with mech soft diet

2) Wt per rx

-------------------------------------------------------------------------------

Addendum: 03/28/19 at 1132 by Dhaval Sears RD

-------------------------------------------------------------------------------

Amended: Links added.

-------------------------------------------------------------------------------

Addendum: 03/28/19 at 1132 by Dhaval Sears RD

-------------------------------------------------------------------------------

reassessment: Pt % mechanical soft diet meeting needs. POLINA baxter/mark RN for 

thiamin/folic/MVI per MD approval given etoh hx w/ ESLD. Pt positive for 

hepatitis w/ RLE cellulitis and RLE/LLE +3 edema. Poor prognosis w/ 

possible hospice consideration per MD note. [facility] 3/27. Will continue to 

monitor.

Recommendations:

1) Continue with mech soft diet

2) thiamin/folic/MVI for etoh per MD

3) Wt per rx

## 2019-03-29 VITALS — SYSTOLIC BLOOD PRESSURE: 107 MMHG | DIASTOLIC BLOOD PRESSURE: 60 MMHG

## 2019-03-29 VITALS — SYSTOLIC BLOOD PRESSURE: 100 MMHG | DIASTOLIC BLOOD PRESSURE: 45 MMHG

## 2019-03-29 VITALS — SYSTOLIC BLOOD PRESSURE: 115 MMHG | DIASTOLIC BLOOD PRESSURE: 56 MMHG

## 2019-03-29 VITALS — DIASTOLIC BLOOD PRESSURE: 63 MMHG | SYSTOLIC BLOOD PRESSURE: 133 MMHG

## 2019-03-29 RX ADMIN — OXYCODONE PRN MG: 5 TABLET ORAL at 17:51

## 2019-03-29 RX ADMIN — RIFAXIMIN SCH MG: 550 TABLET ORAL at 20:41

## 2019-03-29 RX ADMIN — THERA TABS SCH EACH: TAB at 08:03

## 2019-03-29 RX ADMIN — SODIUM CHLORIDE SCH GM: 0.9 IRRIGANT IRRIGATION at 01:26

## 2019-03-29 RX ADMIN — CLINDAMYCIN IN 5 PERCENT DEXTROSE SCH MLS/HR: 18 INJECTION, SOLUTION INTRAVENOUS at 01:26

## 2019-03-29 RX ADMIN — SODIUM CHLORIDE SCH GM: 0.9 IRRIGANT IRRIGATION at 14:21

## 2019-03-29 RX ADMIN — SODIUM CHLORIDE SCH GM: 0.9 IRRIGANT IRRIGATION at 20:41

## 2019-03-29 RX ADMIN — OXYCODONE PRN MG: 5 TABLET ORAL at 03:11

## 2019-03-29 RX ADMIN — CLINDAMYCIN IN 5 PERCENT DEXTROSE SCH MLS/HR: 18 INJECTION, SOLUTION INTRAVENOUS at 08:04

## 2019-03-29 RX ADMIN — OXYCODONE PRN MG: 5 TABLET ORAL at 08:03

## 2019-03-29 RX ADMIN — Medication SCH MMU: at 20:41

## 2019-03-29 RX ADMIN — CEFTRIAXONE SCH MLS/HR: 2 INJECTION, SOLUTION INTRAVENOUS at 09:19

## 2019-03-29 RX ADMIN — Medication SCH MMU: at 08:03

## 2019-03-29 RX ADMIN — RIFAXIMIN SCH MG: 550 TABLET ORAL at 08:07

## 2019-03-29 RX ADMIN — MORPHINE SULFATE PRN MG: 10 SOLUTION ORAL at 18:52

## 2019-03-29 RX ADMIN — OXYCODONE PRN MG: 5 TABLET ORAL at 13:24

## 2019-03-29 RX ADMIN — CLINDAMYCIN IN 5 PERCENT DEXTROSE SCH MLS/HR: 18 INJECTION, SOLUTION INTRAVENOUS at 15:58

## 2019-03-29 RX ADMIN — SODIUM CHLORIDE SCH GM: 0.9 IRRIGANT IRRIGATION at 08:04

## 2019-03-29 NOTE — NUR
Problems reprioritized. Patient report given, questions answered & plan of care reviewed 
with Jacqueline KELLY.

## 2019-03-29 NOTE — NUR
Problems reprioritized. Patient report given, questions answered & plan of care reviewed 
with Jacquelnie KELLY.

## 2019-03-29 NOTE — NUR
Problems reprioritized. Patient report given, questions answered & plan of care reviewed 
with nia wyman.

## 2019-03-30 VITALS — SYSTOLIC BLOOD PRESSURE: 126 MMHG | DIASTOLIC BLOOD PRESSURE: 71 MMHG

## 2019-03-30 VITALS — DIASTOLIC BLOOD PRESSURE: 68 MMHG | SYSTOLIC BLOOD PRESSURE: 121 MMHG

## 2019-03-30 RX ADMIN — Medication SCH MMU: at 19:06

## 2019-03-30 RX ADMIN — RIFAXIMIN SCH MG: 550 TABLET ORAL at 08:13

## 2019-03-30 RX ADMIN — OXYCODONE PRN MG: 5 TABLET ORAL at 19:06

## 2019-03-30 RX ADMIN — SODIUM CHLORIDE SCH GM: 0.9 IRRIGANT IRRIGATION at 19:06

## 2019-03-30 RX ADMIN — RIFAXIMIN SCH MG: 550 TABLET ORAL at 19:06

## 2019-03-30 RX ADMIN — OXYCODONE PRN MG: 5 TABLET ORAL at 01:14

## 2019-03-30 RX ADMIN — Medication SCH MMU: at 08:13

## 2019-03-30 RX ADMIN — SODIUM CHLORIDE SCH GM: 0.9 IRRIGANT IRRIGATION at 01:14

## 2019-03-30 RX ADMIN — MORPHINE SULFATE PRN MG: 10 SOLUTION ORAL at 10:49

## 2019-03-30 RX ADMIN — THERA TABS SCH EACH: TAB at 08:12

## 2019-03-30 RX ADMIN — SODIUM CHLORIDE SCH GM: 0.9 IRRIGANT IRRIGATION at 08:13

## 2019-03-30 RX ADMIN — SODIUM CHLORIDE SCH GM: 0.9 IRRIGANT IRRIGATION at 15:12

## 2019-03-30 RX ADMIN — OXYCODONE PRN MG: 5 TABLET ORAL at 15:12

## 2019-03-30 RX ADMIN — OXYCODONE PRN MG: 5 TABLET ORAL at 08:13

## 2019-03-30 RX ADMIN — OXYCODONE PRN MG: 5 TABLET ORAL at 23:13

## 2019-03-30 NOTE — NUR
Problems reprioritized. Patient report given, questions answered & plan of care reviewed 
with Yesenia KELLY.

## 2019-03-30 NOTE — NUR
Patient in room [facility] 357. I have received report from  GUILLE KELLY  and had the opportunity to 
ask questions and assume patient care.

## 2019-03-30 NOTE — NUR
Patient in room [facility] 357. I have received report from  Jacqueline KELLY  and had the opportunity to 
ask questions and assume patient care.

## 2019-03-30 NOTE — NUR
Patient in room [facility] 357. I have received report from Gary Cortes and had the 
opportunity to ask questions and assume patient care.

-------------------------------------------------------------------------------

Addendum: 03/30/19 at 2154 by Yesenia Sharma RN

-------------------------------------------------------------------------------

Amended: Links added.

## 2019-03-31 VITALS — SYSTOLIC BLOOD PRESSURE: 103 MMHG | DIASTOLIC BLOOD PRESSURE: 71 MMHG

## 2019-03-31 VITALS — SYSTOLIC BLOOD PRESSURE: 119 MMHG | DIASTOLIC BLOOD PRESSURE: 76 MMHG

## 2019-03-31 VITALS — DIASTOLIC BLOOD PRESSURE: 59 MMHG | SYSTOLIC BLOOD PRESSURE: 100 MMHG

## 2019-03-31 VITALS — SYSTOLIC BLOOD PRESSURE: 105 MMHG | DIASTOLIC BLOOD PRESSURE: 48 MMHG

## 2019-03-31 RX ADMIN — Medication SCH MMU: at 08:08

## 2019-03-31 RX ADMIN — OXYCODONE PRN MG: 5 TABLET ORAL at 16:24

## 2019-03-31 RX ADMIN — OXYCODONE PRN MG: 5 TABLET ORAL at 11:56

## 2019-03-31 RX ADMIN — RIFAXIMIN SCH MG: 550 TABLET ORAL at 20:14

## 2019-03-31 RX ADMIN — RIFAXIMIN SCH MG: 550 TABLET ORAL at 08:09

## 2019-03-31 RX ADMIN — SODIUM CHLORIDE SCH GM: 0.9 IRRIGANT IRRIGATION at 02:16

## 2019-03-31 RX ADMIN — THERA TABS SCH EACH: TAB at 08:09

## 2019-03-31 RX ADMIN — SODIUM CHLORIDE SCH GM: 0.9 IRRIGANT IRRIGATION at 20:00

## 2019-03-31 RX ADMIN — SODIUM CHLORIDE SCH GM: 0.9 IRRIGANT IRRIGATION at 12:58

## 2019-03-31 RX ADMIN — Medication SCH MMU: at 20:14

## 2019-03-31 RX ADMIN — OXYCODONE PRN MG: 5 TABLET ORAL at 06:06

## 2019-03-31 RX ADMIN — MORPHINE SULFATE PRN MG: 10 SOLUTION ORAL at 21:19

## 2019-03-31 RX ADMIN — OXYCODONE PRN MG: 5 TABLET ORAL at 20:14

## 2019-03-31 RX ADMIN — SODIUM CHLORIDE SCH GM: 0.9 IRRIGANT IRRIGATION at 08:00

## 2019-03-31 RX ADMIN — MORPHINE SULFATE PRN MG: 10 SOLUTION ORAL at 23:36

## 2019-03-31 NOTE — NUR
Pt has been made palliative/comfort care. [facility] 3/31. Will continue to 

follow per protocol.

Recommendations:

1) Continue with [facility] soft diet per MD

2) routine bowel care

-------------------------------------------------------------------------------

Addendum: 03/31/19 at 1557 by Dhaval Sears RD

-------------------------------------------------------------------------------

Amended: Links added.

## 2019-03-31 NOTE — NUR
Problems reprioritized. Patient report given, questions answered & plan of care reviewed 
with Sadie KELLY. 

-------------------------------------------------------------------------------

Addendum: 03/31/19 at 0628 by Yesenia Sharma RN

-------------------------------------------------------------------------------

Amended: Links added.

## 2019-03-31 NOTE — NUR
Problems reprioritized. Patient report given, questions answered & plan of care reviewed 
with Simona KELLY.

## 2019-03-31 NOTE — NUR
I have received report from  Yesenia KELLY  and had the opportunity to ask questions and 
assume patient care.

## 2019-03-31 NOTE — NUR
Patient is up to bathroom with gas and BM. 

-------------------------------------------------------------------------------

Addendum: 03/31/19 at 1010 by Sadie Wilcox RN

-------------------------------------------------------------------------------

Amended: Links added.

## 2019-04-01 VITALS — SYSTOLIC BLOOD PRESSURE: 102 MMHG | DIASTOLIC BLOOD PRESSURE: 71 MMHG

## 2019-04-01 VITALS — SYSTOLIC BLOOD PRESSURE: 108 MMHG | DIASTOLIC BLOOD PRESSURE: 59 MMHG

## 2019-04-01 VITALS — SYSTOLIC BLOOD PRESSURE: 96 MMHG | DIASTOLIC BLOOD PRESSURE: 56 MMHG

## 2019-04-01 RX ADMIN — SODIUM CHLORIDE SCH GM: 0.9 IRRIGANT IRRIGATION at 19:21

## 2019-04-01 RX ADMIN — RIFAXIMIN SCH MG: 550 TABLET ORAL at 07:49

## 2019-04-01 RX ADMIN — OXYCODONE PRN MG: 5 TABLET ORAL at 22:07

## 2019-04-01 RX ADMIN — THERA TABS SCH EACH: TAB at 07:48

## 2019-04-01 RX ADMIN — SODIUM CHLORIDE SCH GM: 0.9 IRRIGANT IRRIGATION at 02:00

## 2019-04-01 RX ADMIN — OXYCODONE PRN MG: 5 TABLET ORAL at 07:49

## 2019-04-01 RX ADMIN — RIFAXIMIN SCH MG: 550 TABLET ORAL at 19:24

## 2019-04-01 RX ADMIN — SODIUM CHLORIDE SCH GM: 0.9 IRRIGANT IRRIGATION at 13:03

## 2019-04-01 RX ADMIN — OXYCODONE PRN MG: 5 TABLET ORAL at 17:27

## 2019-04-01 RX ADMIN — OXYCODONE PRN MG: 5 TABLET ORAL at 02:31

## 2019-04-01 RX ADMIN — Medication SCH MMU: at 07:48

## 2019-04-01 RX ADMIN — SODIUM CHLORIDE SCH GM: 0.9 IRRIGANT IRRIGATION at 07:49

## 2019-04-01 RX ADMIN — Medication SCH MMU: at 19:24

## 2019-04-01 RX ADMIN — OXYCODONE PRN MG: 5 TABLET ORAL at 13:08

## 2019-04-01 NOTE — NUR
Problems reprioritized. Patient report given, questions answered & plan of care reviewed 
with Sadie KELLY and Student nurse Kyra KELLY.

## 2019-04-01 NOTE — NUR
I have received report from Simona KELLY and had the opportunity to ask questions and assume 
patient care.

## 2019-04-02 RX ADMIN — SODIUM CHLORIDE SCH GM: 0.9 IRRIGANT IRRIGATION at 02:00

## 2019-04-02 RX ADMIN — OXYCODONE PRN MG: 5 TABLET ORAL at 03:29

## 2019-12-11 ENCOUNTER — HOSPITAL ENCOUNTER (EMERGENCY)
Dept: HOSPITAL 94 - ER | Age: 59
Discharge: HOME | End: 2019-12-11
Payer: MEDICAID

## 2019-12-11 VITALS — SYSTOLIC BLOOD PRESSURE: 148 MMHG | DIASTOLIC BLOOD PRESSURE: 83 MMHG

## 2019-12-11 VITALS — HEIGHT: 68 IN | WEIGHT: 182.39 LBS | BODY MASS INDEX: 27.64 KG/M2

## 2019-12-11 DIAGNOSIS — L03.116: ICD-10-CM

## 2019-12-11 DIAGNOSIS — Z79.899: ICD-10-CM

## 2019-12-11 DIAGNOSIS — Z59.0: ICD-10-CM

## 2019-12-11 DIAGNOSIS — Z60.2: ICD-10-CM

## 2019-12-11 DIAGNOSIS — R60.0: ICD-10-CM

## 2019-12-11 DIAGNOSIS — Z56.0: ICD-10-CM

## 2019-12-11 DIAGNOSIS — L03.115: Primary | ICD-10-CM

## 2019-12-11 DIAGNOSIS — E11.9: ICD-10-CM

## 2019-12-11 LAB
ALBUMIN SERPL BCP-MCNC: 2.5 G/DL (ref 3.4–5)
ALBUMIN/GLOB SERPL: 0.7 {RATIO} (ref 1.1–1.5)
ALP SERPL-CCNC: 99 IU/L (ref 46–116)
ALT SERPL W P-5'-P-CCNC: 76 U/L (ref 12–78)
ANION GAP SERPL CALCULATED.3IONS-SCNC: 8 MMOL/L (ref 8–16)
AST SERPL W P-5'-P-CCNC: 92 U/L (ref 10–37)
BASOPHILS # BLD AUTO: 0.1 X10'3 (ref 0–0.2)
BASOPHILS NFR BLD AUTO: 1 % (ref 0–1)
BILIRUB SERPL-MCNC: 2.9 MG/DL (ref 0.1–1)
BUN SERPL-MCNC: 12 MG/DL (ref 7–18)
BUN/CREAT SERPL: 18.5 (ref 5.4–32)
CALCIUM SERPL-MCNC: 8.1 MG/DL (ref 8.5–10.1)
CHLORIDE SERPL-SCNC: 106 MMOL/L (ref 99–107)
CO2 SERPL-SCNC: 24.9 MMOL/L (ref 24–32)
CREAT SERPL-MCNC: 0.65 MG/DL (ref 0.6–1.1)
EOSINOPHIL # BLD AUTO: 0.1 X10'3 (ref 0–0.9)
EOSINOPHIL NFR BLD AUTO: 1.8 % (ref 0–6)
ERYTHROCYTE [DISTWIDTH] IN BLOOD BY AUTOMATED COUNT: 14.9 % (ref 11.5–14.5)
GFR SERPL CREATININE-BSD FRML MDRD: > 90 ML/MIN
GLUCOSE SERPL-MCNC: 129 MG/DL (ref 70–104)
HCT VFR BLD AUTO: 35.4 % (ref 42–52)
HGB BLD-MCNC: 12.6 G/DL (ref 14–17.9)
LYMPHOCYTES # BLD AUTO: 1.1 X10'3 (ref 1.1–4.8)
LYMPHOCYTES NFR BLD AUTO: 17.4 % (ref 21–51)
MCH RBC QN AUTO: 36.6 PG (ref 27–31)
MCHC RBC AUTO-ENTMCNC: 35.8 G/DL (ref 33–36.5)
MCV RBC AUTO: 102.2 FL (ref 78–98)
MONOCYTES # BLD AUTO: 0.7 X10'3 (ref 0–0.9)
MONOCYTES NFR BLD AUTO: 11.4 % (ref 2–12)
NEUTROPHILS # BLD AUTO: 4.4 X10'3 (ref 1.8–7.7)
NEUTROPHILS NFR BLD AUTO: 68.4 % (ref 42–75)
PLATELET # BLD AUTO: 74 X10'3 (ref 140–440)
PMV BLD AUTO: 9.1 FL (ref 7.4–10.4)
POTASSIUM SERPL-SCNC: 4 MMOL/L (ref 3.5–5.1)
PROT SERPL-MCNC: 6.2 G/DL (ref 6.4–8.2)
RBC # BLD AUTO: 3.46 X10'6 (ref 4.7–6.1)
SODIUM SERPL-SCNC: 139 MMOL/L (ref 135–145)
WBC # BLD AUTO: 6.4 X10'3 (ref 4.5–11)

## 2019-12-11 PROCEDURE — 93971 EXTREMITY STUDY: CPT

## 2019-12-11 PROCEDURE — 36415 COLL VENOUS BLD VENIPUNCTURE: CPT

## 2019-12-11 PROCEDURE — 82140 ASSAY OF AMMONIA: CPT

## 2019-12-11 PROCEDURE — 99284 EMERGENCY DEPT VISIT MOD MDM: CPT

## 2019-12-11 PROCEDURE — 80053 COMPREHEN METABOLIC PANEL: CPT

## 2019-12-11 PROCEDURE — 85025 COMPLETE CBC W/AUTO DIFF WBC: CPT

## 2019-12-11 SDOH — ECONOMIC STABILITY - HOUSING INSECURITY: HOMELESSNESS: Z59.0

## 2019-12-11 SDOH — ECONOMIC STABILITY - INCOME SECURITY: UNEMPLOYMENT, UNSPECIFIED: Z56.0

## 2019-12-11 SDOH — SOCIAL STABILITY - SOCIAL INSECURITY: PROBLEMS RELATED TO LIVING ALONE: Z60.2

## 2019-12-11 NOTE — NUR
PT WAS SLEEPING, PT INSTRUCTED OF DISCHARGE PLAN, PT STATES HE HAS PMD, PT 
OFFERED FOOD AND BUS PASS BUT DECLINES, PT HAS WEATHER APPROPRIATE COTHING, PT 
UNDERSTANDS TO TAKE FULL COURSE ABX.

## 2020-01-23 ENCOUNTER — HOSPITAL ENCOUNTER (INPATIENT)
Dept: HOSPITAL 94 - ER | Age: 60
LOS: 4 days | Discharge: HOME | DRG: 720 | End: 2020-01-27
Attending: FAMILY MEDICINE | Admitting: FAMILY MEDICINE
Payer: MEDICAID

## 2020-01-23 VITALS — DIASTOLIC BLOOD PRESSURE: 70 MMHG | SYSTOLIC BLOOD PRESSURE: 123 MMHG

## 2020-01-23 VITALS — SYSTOLIC BLOOD PRESSURE: 124 MMHG | DIASTOLIC BLOOD PRESSURE: 74 MMHG

## 2020-01-23 VITALS — WEIGHT: 185.39 LBS | BODY MASS INDEX: 28.1 KG/M2 | HEIGHT: 68 IN

## 2020-01-23 DIAGNOSIS — Z71.6: ICD-10-CM

## 2020-01-23 DIAGNOSIS — Z28.21: ICD-10-CM

## 2020-01-23 DIAGNOSIS — F32.9: ICD-10-CM

## 2020-01-23 DIAGNOSIS — E87.1: ICD-10-CM

## 2020-01-23 DIAGNOSIS — Z91.14: ICD-10-CM

## 2020-01-23 DIAGNOSIS — D64.9: ICD-10-CM

## 2020-01-23 DIAGNOSIS — Z82.5: ICD-10-CM

## 2020-01-23 DIAGNOSIS — K72.90: ICD-10-CM

## 2020-01-23 DIAGNOSIS — F17.200: ICD-10-CM

## 2020-01-23 DIAGNOSIS — Z60.2: ICD-10-CM

## 2020-01-23 DIAGNOSIS — Z59.0: ICD-10-CM

## 2020-01-23 DIAGNOSIS — I89.0: ICD-10-CM

## 2020-01-23 DIAGNOSIS — Z66: ICD-10-CM

## 2020-01-23 DIAGNOSIS — F10.20: ICD-10-CM

## 2020-01-23 DIAGNOSIS — L03.116: ICD-10-CM

## 2020-01-23 DIAGNOSIS — E87.6: ICD-10-CM

## 2020-01-23 DIAGNOSIS — L03.115: ICD-10-CM

## 2020-01-23 DIAGNOSIS — E11.9: ICD-10-CM

## 2020-01-23 DIAGNOSIS — K76.6: ICD-10-CM

## 2020-01-23 DIAGNOSIS — K70.30: ICD-10-CM

## 2020-01-23 DIAGNOSIS — D68.4: ICD-10-CM

## 2020-01-23 DIAGNOSIS — Z79.899: ICD-10-CM

## 2020-01-23 DIAGNOSIS — F15.10: ICD-10-CM

## 2020-01-23 DIAGNOSIS — A41.9: Primary | ICD-10-CM

## 2020-01-23 DIAGNOSIS — Z71.51: ICD-10-CM

## 2020-01-23 DIAGNOSIS — Z91.19: ICD-10-CM

## 2020-01-23 LAB
ALBUMIN SERPL BCP-MCNC: 2 G/DL (ref 3.4–5)
ALBUMIN/GLOB SERPL: 0.5 {RATIO} (ref 1.1–1.5)
ALP SERPL-CCNC: 92 IU/L (ref 46–116)
ALT SERPL W P-5'-P-CCNC: 42 U/L (ref 12–78)
AMPHETAMINES UR QL SCN: POSITIVE
ANION GAP SERPL CALCULATED.3IONS-SCNC: 9 MMOL/L (ref 8–16)
APTT PPP: 36 SECONDS (ref 22–32)
AST SERPL W P-5'-P-CCNC: 59 U/L (ref 10–37)
BACTERIA URNS QL MICRO: (no result) /HPF
BARBITURATES UR QL SCN: NEGATIVE
BASOPHILS # BLD AUTO: 0.1 X10'3 (ref 0–0.2)
BASOPHILS NFR BLD AUTO: 0.4 % (ref 0–1)
BENZODIAZ UR QL SCN: NEGATIVE
BILIRUB SERPL-MCNC: 4.2 MG/DL (ref 0.1–1)
BUN SERPL-MCNC: 16 MG/DL (ref 7–18)
BUN/CREAT SERPL: 17.4 (ref 5.4–32)
BZE UR QL SCN: NEGATIVE
CALCIUM SERPL-MCNC: 7.6 MG/DL (ref 8.5–10.1)
CANNABINOIDS UR QL SCN: NEGATIVE
CHLORIDE SERPL-SCNC: 102 MMOL/L (ref 99–107)
CLARITY UR: CLEAR
CO2 SERPL-SCNC: 22.6 MMOL/L (ref 24–32)
COLOR UR: YELLOW
CREAT SERPL-MCNC: 0.92 MG/DL (ref 0.6–1.1)
DEPRECATED SQUAMOUS URNS QL MICRO: (no result) /LPF
EOSINOPHIL # BLD AUTO: 0.2 X10'3 (ref 0–0.9)
EOSINOPHIL NFR BLD AUTO: 1.5 % (ref 0–6)
ERYTHROCYTE [DISTWIDTH] IN BLOOD BY AUTOMATED COUNT: 14.1 % (ref 11.5–14.5)
ETHANOL SERPL-MCNC: < 0.01 GM/DL (ref 0–0.01)
GFR SERPL CREATININE-BSD FRML MDRD: 84 ML/MIN
GLUCOSE SERPL-MCNC: 154 MG/DL (ref 70–104)
GLUCOSE UR STRIP-MCNC: NEGATIVE MG/DL
HBA1C MFR BLD: 5.4 % (ref 4.5–6.2)
HCT VFR BLD AUTO: 32.6 % (ref 42–52)
HGB BLD-MCNC: 11.4 G/DL (ref 14–17.9)
HGB UR QL STRIP: (no result)
HYALINE CASTS URNS QL MICRO: (no result) /LPF
KETONES UR STRIP-MCNC: NEGATIVE MG/DL
LEUKOCYTE ESTERASE UR QL STRIP: NEGATIVE
LIPASE SERPL-CCNC: 107 U/L (ref 73–393)
LYMPHOCYTES # BLD AUTO: 1.7 X10'3 (ref 1.1–4.8)
LYMPHOCYTES NFR BLD AUTO: 14.4 % (ref 21–51)
MCH RBC QN AUTO: 36.4 PG (ref 27–31)
MCHC RBC AUTO-ENTMCNC: 35 G/DL (ref 33–36.5)
MCV RBC AUTO: 104.1 FL (ref 78–98)
METHADONE UR QL SCN: NEGATIVE
MONOCYTES # BLD AUTO: 1.5 X10'3 (ref 0–0.9)
MONOCYTES NFR BLD AUTO: 12.3 % (ref 2–12)
MUCOUS THREADS URNS QL MICRO: (no result) /LPF
NEUTROPHILS # BLD AUTO: 8.7 X10'3 (ref 1.8–7.7)
NEUTROPHILS NFR BLD AUTO: 71.4 % (ref 42–75)
NITRITE UR QL STRIP: NEGATIVE
OPIATES UR QL SCN: POSITIVE
PCP UR QL SCN: NEGATIVE
PH UR STRIP: 6 [PH] (ref 4.8–8)
PLATELET # BLD AUTO: 111 X10'3 (ref 140–440)
PMV BLD AUTO: 8 FL (ref 7.4–10.4)
POTASSIUM SERPL-SCNC: 3.4 MMOL/L (ref 3.5–5.1)
PROT SERPL-MCNC: 6 G/DL (ref 6.4–8.2)
PROT UR QL STRIP: NEGATIVE MG/DL
RBC # BLD AUTO: 3.13 X10'6 (ref 4.7–6.1)
RBC #/AREA URNS HPF: (no result) /HPF (ref 0–2)
SODIUM SERPL-SCNC: 134 MMOL/L (ref 135–145)
SP GR UR STRIP: 1.02 (ref 1–1.03)
URN COLLECT METHOD CLASS: (no result)
UROBILINOGEN UR STRIP-MCNC: >=8 E.U/DL (ref 0.2–1)
WBC # BLD AUTO: 12.2 X10'3 (ref 4.5–11)
WBC #/AREA URNS HPF: (no result) /HPF (ref 0–4)

## 2020-01-23 PROCEDURE — 80320 DRUG SCREEN QUANTALCOHOLS: CPT

## 2020-01-23 PROCEDURE — 97161 PT EVAL LOW COMPLEX 20 MIN: CPT

## 2020-01-23 PROCEDURE — 99285 EMERGENCY DEPT VISIT HI MDM: CPT

## 2020-01-23 PROCEDURE — 84100 ASSAY OF PHOSPHORUS: CPT

## 2020-01-23 PROCEDURE — 90732 PPSV23 VACC 2 YRS+ SUBQ/IM: CPT

## 2020-01-23 PROCEDURE — 80305 DRUG TEST PRSMV DIR OPT OBS: CPT

## 2020-01-23 PROCEDURE — 83605 ASSAY OF LACTIC ACID: CPT

## 2020-01-23 PROCEDURE — 82140 ASSAY OF AMMONIA: CPT

## 2020-01-23 PROCEDURE — 85610 PROTHROMBIN TIME: CPT

## 2020-01-23 PROCEDURE — 80053 COMPREHEN METABOLIC PANEL: CPT

## 2020-01-23 PROCEDURE — 83880 ASSAY OF NATRIURETIC PEPTIDE: CPT

## 2020-01-23 PROCEDURE — 85730 THROMBOPLASTIN TIME PARTIAL: CPT

## 2020-01-23 PROCEDURE — 97530 THERAPEUTIC ACTIVITIES: CPT

## 2020-01-23 PROCEDURE — 87040 BLOOD CULTURE FOR BACTERIA: CPT

## 2020-01-23 PROCEDURE — 80202 ASSAY OF VANCOMYCIN: CPT

## 2020-01-23 PROCEDURE — 85025 COMPLETE CBC W/AUTO DIFF WBC: CPT

## 2020-01-23 PROCEDURE — 83036 HEMOGLOBIN GLYCOSYLATED A1C: CPT

## 2020-01-23 PROCEDURE — 80061 LIPID PANEL: CPT

## 2020-01-23 PROCEDURE — 96365 THER/PROPH/DIAG IV INF INIT: CPT

## 2020-01-23 PROCEDURE — 97116 GAIT TRAINING THERAPY: CPT

## 2020-01-23 PROCEDURE — 93971 EXTREMITY STUDY: CPT

## 2020-01-23 PROCEDURE — 71046 X-RAY EXAM CHEST 2 VIEWS: CPT

## 2020-01-23 PROCEDURE — 87081 CULTURE SCREEN ONLY: CPT

## 2020-01-23 PROCEDURE — 84145 PROCALCITONIN (PCT): CPT

## 2020-01-23 PROCEDURE — 36415 COLL VENOUS BLD VENIPUNCTURE: CPT

## 2020-01-23 PROCEDURE — 82948 REAGENT STRIP/BLOOD GLUCOSE: CPT

## 2020-01-23 PROCEDURE — 83735 ASSAY OF MAGNESIUM: CPT

## 2020-01-23 PROCEDURE — 83690 ASSAY OF LIPASE: CPT

## 2020-01-23 PROCEDURE — 81001 URINALYSIS AUTO W/SCOPE: CPT

## 2020-01-23 RX ADMIN — POTASSIUM CHLORIDE PRN MEQ: 1500 TABLET, EXTENDED RELEASE ORAL at 21:39

## 2020-01-23 RX ADMIN — HEPARIN SODIUM SCH UNIT: 5000 INJECTION, SOLUTION INTRAVENOUS; SUBCUTANEOUS at 20:00

## 2020-01-23 RX ADMIN — SODIUM CHLORIDE SCH MLS/HR: 9 INJECTION INTRAMUSCULAR; INTRAVENOUS; SUBCUTANEOUS at 18:00

## 2020-01-23 RX ADMIN — RIFAXIMIN SCH MG: 550 TABLET ORAL at 20:00

## 2020-01-23 RX ADMIN — FUROSEMIDE SCH MG: 10 INJECTION, SOLUTION INTRAMUSCULAR; INTRAVENOUS at 21:38

## 2020-01-23 RX ADMIN — PROPRANOLOL HYDROCHLORIDE SCH MG: 10 TABLET ORAL at 21:40

## 2020-01-23 SDOH — ECONOMIC STABILITY - HOUSING INSECURITY: HOMELESSNESS: Z59.0

## 2020-01-23 SDOH — SOCIAL STABILITY - SOCIAL INSECURITY: PROBLEMS RELATED TO LIVING ALONE: Z60.2

## 2020-01-23 NOTE — NUR
Patient in room PCU 3013. I have received report from Cecilia RN (ER) and had the opportunity 
to ask questions and assume patient care. Patient arrived to PCU by meghana and was able to 
ambulate to his bed. Ex-wife Uday at bedside. Telemetry placed, vitals taken. Patient 
oriented to room and call light. Will continue to monitor.

## 2020-01-24 VITALS — DIASTOLIC BLOOD PRESSURE: 53 MMHG | SYSTOLIC BLOOD PRESSURE: 104 MMHG

## 2020-01-24 VITALS — SYSTOLIC BLOOD PRESSURE: 114 MMHG | DIASTOLIC BLOOD PRESSURE: 56 MMHG

## 2020-01-24 VITALS — DIASTOLIC BLOOD PRESSURE: 54 MMHG | SYSTOLIC BLOOD PRESSURE: 110 MMHG

## 2020-01-24 VITALS — DIASTOLIC BLOOD PRESSURE: 70 MMHG | SYSTOLIC BLOOD PRESSURE: 112 MMHG

## 2020-01-24 VITALS — SYSTOLIC BLOOD PRESSURE: 106 MMHG | DIASTOLIC BLOOD PRESSURE: 56 MMHG

## 2020-01-24 VITALS — SYSTOLIC BLOOD PRESSURE: 131 MMHG | DIASTOLIC BLOOD PRESSURE: 69 MMHG

## 2020-01-24 LAB
ALBUMIN SERPL BCP-MCNC: 1.6 G/DL (ref 3.4–5)
ALBUMIN/GLOB SERPL: 0.4 {RATIO} (ref 1.1–1.5)
ALP SERPL-CCNC: 70 IU/L (ref 46–116)
ALT SERPL W P-5'-P-CCNC: 34 U/L (ref 12–78)
ANION GAP SERPL CALCULATED.3IONS-SCNC: 7 MMOL/L (ref 8–16)
AST SERPL W P-5'-P-CCNC: 43 U/L (ref 10–37)
BASOPHILS # BLD AUTO: 0.1 X10'3 (ref 0–0.2)
BASOPHILS NFR BLD AUTO: 0.8 % (ref 0–1)
BILIRUB SERPL-MCNC: 2.4 MG/DL (ref 0.1–1)
BUN SERPL-MCNC: 17 MG/DL (ref 7–18)
BUN/CREAT SERPL: 21.3 (ref 5.4–32)
CALCIUM SERPL-MCNC: 7 MG/DL (ref 8.5–10.1)
CHLORIDE SERPL-SCNC: 106 MMOL/L (ref 99–107)
CHOLEST SERPL-MCNC: 80 MG/DL (ref 0–200)
CHOLEST/HDLC SERPL: 3.3 {RATIO} (ref 0–4.99)
CO2 SERPL-SCNC: 23.9 MMOL/L (ref 24–32)
CREAT SERPL-MCNC: 0.8 MG/DL (ref 0.6–1.1)
EOSINOPHIL # BLD AUTO: 0.5 X10'3 (ref 0–0.9)
EOSINOPHIL NFR BLD AUTO: 5.6 % (ref 0–6)
EOSINOPHIL NFR BLD MANUAL: 6 % (ref 0–6)
ERYTHROCYTE [DISTWIDTH] IN BLOOD BY AUTOMATED COUNT: 14.5 % (ref 11.5–14.5)
GFR SERPL CREATININE-BSD FRML MDRD: > 90 ML/MIN
GLUCOSE SERPL-MCNC: 119 MG/DL (ref 70–104)
HCT VFR BLD AUTO: 29.2 % (ref 42–52)
HDLC SERPL-MCNC: 24 MG/DL (ref 35–60)
HGB BLD-MCNC: 10.2 G/DL (ref 14–17.9)
LDLC SERPL DIRECT ASSAY-MCNC: 22 MG/DL (ref 50–100)
LYMPHOCYTES # BLD AUTO: 1.5 X10'3 (ref 1.1–4.8)
LYMPHOCYTES NFR BLD AUTO: 19.2 % (ref 21–51)
LYMPHOCYTES NFR BLD MANUAL: 16 % (ref 21–51)
MACROCYTES BLD QL SMEAR: (no result)
MAGNESIUM SERPL-MCNC: 1.6 MG/DL (ref 1.5–2.4)
MCH RBC QN AUTO: 36.1 PG (ref 27–31)
MCHC RBC AUTO-ENTMCNC: 35.1 G/DL (ref 33–36.5)
MCV RBC AUTO: 102.8 FL (ref 78–98)
MONOCYTES # BLD AUTO: 1.2 X10'3 (ref 0–0.9)
MONOCYTES NFR BLD AUTO: 15 % (ref 2–12)
MONOCYTES NFR BLD MANUAL: 14 % (ref 2–12)
NEUTROPHILS # BLD AUTO: 4.8 X10'3 (ref 1.8–7.7)
NEUTROPHILS NFR BLD AUTO: 59.4 % (ref 42–75)
NEUTS BAND # BLD MANUAL: 2 % (ref 0–10)
NEUTS SEG NFR BLD MANUAL: 62 % (ref 42–75)
PHOSPHATE SERPL-MCNC: 1.4 MG/DL (ref 2.3–4.5)
PLATELET # BLD AUTO: 106 X10'3 (ref 140–440)
PLATELET BLD QL SMEAR: (no result)
PMV BLD AUTO: 7.7 FL (ref 7.4–10.4)
POIKILOCYTOSIS BLD QL SMEAR: (no result)
POLYCHROMASIA BLD QL SMEAR: (no result)
POTASSIUM SERPL-SCNC: 3.2 MMOL/L (ref 3.5–5.1)
PROT SERPL-MCNC: 5.2 G/DL (ref 6.4–8.2)
RBC # BLD AUTO: 2.84 X10'6 (ref 4.7–6.1)
RBC MORPH BLD: (no result)
SODIUM SERPL-SCNC: 137 MMOL/L (ref 135–145)
TOTAL CELLS COUNTED FLD: 100
TRIGL SERPL-MCNC: 75 MG/DL (ref 20–135)
WBC # BLD AUTO: 8 X10'3 (ref 4.5–11)

## 2020-01-24 RX ADMIN — SODIUM CHLORIDE SCH MLS/HR: 9 INJECTION INTRAMUSCULAR; INTRAVENOUS; SUBCUTANEOUS at 08:20

## 2020-01-24 RX ADMIN — RIFAXIMIN SCH MG: 550 TABLET ORAL at 20:12

## 2020-01-24 RX ADMIN — PROPRANOLOL HYDROCHLORIDE SCH MG: 10 TABLET ORAL at 20:11

## 2020-01-24 RX ADMIN — HEPARIN SODIUM SCH UNIT: 5000 INJECTION, SOLUTION INTRAVENOUS; SUBCUTANEOUS at 20:00

## 2020-01-24 RX ADMIN — HEPARIN SODIUM SCH UNIT: 5000 INJECTION, SOLUTION INTRAVENOUS; SUBCUTANEOUS at 08:21

## 2020-01-24 RX ADMIN — THERA TABS SCH EACH: TAB at 08:22

## 2020-01-24 RX ADMIN — TAZOBACTAM SODIUM AND PIPERACILLIN SODIUM SCH MLS/HR: 375; 3 INJECTION, SOLUTION INTRAVENOUS at 00:05

## 2020-01-24 RX ADMIN — SODIUM CHLORIDE SCH GM: 0.9 IRRIGANT IRRIGATION at 23:58

## 2020-01-24 RX ADMIN — VANCOMYCIN HYDROCHLORIDE SCH MLS/HR: 1.25 INJECTION, POWDER, LYOPHILIZED, FOR SOLUTION INTRAVENOUS at 08:21

## 2020-01-24 RX ADMIN — SODIUM CHLORIDE SCH MLS/HR: 9 INJECTION INTRAMUSCULAR; INTRAVENOUS; SUBCUTANEOUS at 20:14

## 2020-01-24 RX ADMIN — SODIUM CHLORIDE SCH GM: 0.9 IRRIGANT IRRIGATION at 20:14

## 2020-01-24 RX ADMIN — SODIUM CHLORIDE SCH GM: 0.9 IRRIGANT IRRIGATION at 20:00

## 2020-01-24 RX ADMIN — SODIUM CHLORIDE SCH MLS/HR: 9 INJECTION INTRAMUSCULAR; INTRAVENOUS; SUBCUTANEOUS at 02:52

## 2020-01-24 RX ADMIN — VANCOMYCIN HYDROCHLORIDE SCH MLS/HR: 1.25 INJECTION, POWDER, LYOPHILIZED, FOR SOLUTION INTRAVENOUS at 19:54

## 2020-01-24 RX ADMIN — SODIUM CHLORIDE SCH GM: 0.9 IRRIGANT IRRIGATION at 15:20

## 2020-01-24 RX ADMIN — RIFAXIMIN SCH MG: 550 TABLET ORAL at 08:22

## 2020-01-24 RX ADMIN — FUROSEMIDE SCH MG: 10 INJECTION, SOLUTION INTRAMUSCULAR; INTRAVENOUS at 20:00

## 2020-01-24 RX ADMIN — FUROSEMIDE SCH MG: 10 INJECTION, SOLUTION INTRAMUSCULAR; INTRAVENOUS at 08:34

## 2020-01-24 RX ADMIN — TAZOBACTAM SODIUM AND PIPERACILLIN SODIUM SCH MLS/HR: 375; 3 INJECTION, SOLUTION INTRAVENOUS at 23:58

## 2020-01-24 RX ADMIN — PROPRANOLOL HYDROCHLORIDE SCH MG: 10 TABLET ORAL at 08:22

## 2020-01-24 RX ADMIN — SODIUM CHLORIDE SCH GM: 0.9 IRRIGANT IRRIGATION at 16:42

## 2020-01-24 RX ADMIN — FUROSEMIDE SCH MG: 10 INJECTION, SOLUTION INTRAMUSCULAR; INTRAVENOUS at 20:10

## 2020-01-24 RX ADMIN — Medication SCH MMU: at 20:12

## 2020-01-24 RX ADMIN — TAZOBACTAM SODIUM AND PIPERACILLIN SODIUM SCH MLS/HR: 375; 3 INJECTION, SOLUTION INTRAVENOUS at 16:31

## 2020-01-24 RX ADMIN — POTASSIUM CHLORIDE PRN MEQ: 1500 TABLET, EXTENDED RELEASE ORAL at 08:58

## 2020-01-24 RX ADMIN — TAZOBACTAM SODIUM AND PIPERACILLIN SODIUM SCH MLS/HR: 375; 3 INJECTION, SOLUTION INTRAVENOUS at 08:00

## 2020-01-24 RX ADMIN — SODIUM CHLORIDE SCH MLS/HR: 9 INJECTION INTRAMUSCULAR; INTRAVENOUS; SUBCUTANEOUS at 12:52

## 2020-01-24 RX ADMIN — POTASSIUM CHLORIDE PRN MEQ: 1500 TABLET, EXTENDED RELEASE ORAL at 20:10

## 2020-01-24 NOTE — NUR
Problems reprioritized. Patient report given, questions answered & plan of care reviewed 
with Raina KELLY.

## 2020-01-24 NOTE — NUR
Problems reprioritized. Patient report given, questions answered & plan of care reviewed 
with Bertha KELLY.

## 2020-01-24 NOTE — NUR
Patient in room PCU 3013. I have received report from Bertha KELLY and had the opportunity to 
ask questions and assume patient care.

## 2020-01-24 NOTE — NUR
Patient in room PCU 3013. I have received report from Raina KELLY and had the opportunity to 
ask questions and assume patient care.

## 2020-01-24 NOTE — NUR
Paged MD regarding family request to speak with him. Per wife Ute, son Meng just flew 
in and would like a few minutes with the MD.



PAGER ID: [phone]

MESSAGE: [id number] ASHOK Sandoval. Family at bedside, requesting to speak with you. [facility] 2231

## 2020-01-24 NOTE — NUR
Patient is getting irritated about taking his medications. He stated "I cannot take all 
these medications!" He refused his lasix and lactulose. I explained the need for both 
medications but he still declined. Heparin was held due to low platelet count.

## 2020-01-25 VITALS — SYSTOLIC BLOOD PRESSURE: 103 MMHG | DIASTOLIC BLOOD PRESSURE: 60 MMHG

## 2020-01-25 VITALS — DIASTOLIC BLOOD PRESSURE: 56 MMHG | SYSTOLIC BLOOD PRESSURE: 106 MMHG

## 2020-01-25 VITALS — SYSTOLIC BLOOD PRESSURE: 118 MMHG | DIASTOLIC BLOOD PRESSURE: 72 MMHG

## 2020-01-25 VITALS — SYSTOLIC BLOOD PRESSURE: 108 MMHG | DIASTOLIC BLOOD PRESSURE: 55 MMHG

## 2020-01-25 VITALS — SYSTOLIC BLOOD PRESSURE: 97 MMHG | DIASTOLIC BLOOD PRESSURE: 57 MMHG

## 2020-01-25 VITALS — SYSTOLIC BLOOD PRESSURE: 99 MMHG | DIASTOLIC BLOOD PRESSURE: 49 MMHG

## 2020-01-25 LAB
ALBUMIN SERPL BCP-MCNC: 1.6 G/DL (ref 3.4–5)
ALBUMIN/GLOB SERPL: 0.4 {RATIO} (ref 1.1–1.5)
ALP SERPL-CCNC: 69 IU/L (ref 46–116)
ALT SERPL W P-5'-P-CCNC: 28 U/L (ref 12–78)
ANION GAP SERPL CALCULATED.3IONS-SCNC: 8 MMOL/L (ref 8–16)
AST SERPL W P-5'-P-CCNC: 35 U/L (ref 10–37)
BASOPHILS # BLD AUTO: 0.1 X10'3 (ref 0–0.2)
BASOPHILS NFR BLD AUTO: 1.3 % (ref 0–1)
BILIRUB SERPL-MCNC: 2.6 MG/DL (ref 0.1–1)
BUN SERPL-MCNC: 12 MG/DL (ref 7–18)
BUN/CREAT SERPL: 15.8 (ref 5.4–32)
CALCIUM SERPL-MCNC: 6.7 MG/DL (ref 8.5–10.1)
CHLORIDE SERPL-SCNC: 105 MMOL/L (ref 99–107)
CO2 SERPL-SCNC: 23.8 MMOL/L (ref 24–32)
CREAT SERPL-MCNC: 0.76 MG/DL (ref 0.6–1.1)
EOSINOPHIL # BLD AUTO: 0.4 X10'3 (ref 0–0.9)
EOSINOPHIL NFR BLD AUTO: 5.4 % (ref 0–6)
ERYTHROCYTE [DISTWIDTH] IN BLOOD BY AUTOMATED COUNT: 14.6 % (ref 11.5–14.5)
GFR SERPL CREATININE-BSD FRML MDRD: > 90 ML/MIN
GLUCOSE SERPL-MCNC: 108 MG/DL (ref 70–104)
HCT VFR BLD AUTO: 30.3 % (ref 42–52)
HGB BLD-MCNC: 10.5 G/DL (ref 14–17.9)
LYMPHOCYTES # BLD AUTO: 1.4 X10'3 (ref 1.1–4.8)
LYMPHOCYTES NFR BLD AUTO: 17.6 % (ref 21–51)
MAGNESIUM SERPL-MCNC: 1.6 MG/DL (ref 1.5–2.4)
MCH RBC QN AUTO: 35.9 PG (ref 27–31)
MCHC RBC AUTO-ENTMCNC: 34.6 G/DL (ref 33–36.5)
MCV RBC AUTO: 103.6 FL (ref 78–98)
MONOCYTES # BLD AUTO: 1.3 X10'3 (ref 0–0.9)
MONOCYTES NFR BLD AUTO: 16.1 % (ref 2–12)
NEUTROPHILS # BLD AUTO: 4.7 X10'3 (ref 1.8–7.7)
NEUTROPHILS NFR BLD AUTO: 59.6 % (ref 42–75)
PHOSPHATE SERPL-MCNC: 1.9 MG/DL (ref 2.3–4.5)
PLATELET # BLD AUTO: 101 X10'3 (ref 140–440)
PMV BLD AUTO: 8 FL (ref 7.4–10.4)
POTASSIUM SERPL-SCNC: 3 MMOL/L (ref 3.5–5.1)
PROT SERPL-MCNC: 5.3 G/DL (ref 6.4–8.2)
RBC # BLD AUTO: 2.92 X10'6 (ref 4.7–6.1)
SODIUM SERPL-SCNC: 137 MMOL/L (ref 135–145)
WBC # BLD AUTO: 7.8 X10'3 (ref 4.5–11)

## 2020-01-25 RX ADMIN — HEPARIN SODIUM SCH UNIT: 5000 INJECTION, SOLUTION INTRAVENOUS; SUBCUTANEOUS at 07:34

## 2020-01-25 RX ADMIN — HYDROCODONE BITARTRATE AND ACETAMINOPHEN PRN TAB: 5; 325 TABLET ORAL at 13:37

## 2020-01-25 RX ADMIN — RIFAXIMIN SCH MG: 550 TABLET ORAL at 07:32

## 2020-01-25 RX ADMIN — HYDROCODONE BITARTRATE AND ACETAMINOPHEN PRN TAB: 5; 325 TABLET ORAL at 07:48

## 2020-01-25 RX ADMIN — Medication SCH APPLIC: at 21:32

## 2020-01-25 RX ADMIN — TAZOBACTAM SODIUM AND PIPERACILLIN SODIUM SCH MLS/HR: 375; 3 INJECTION, SOLUTION INTRAVENOUS at 15:59

## 2020-01-25 RX ADMIN — THERA TABS SCH EACH: TAB at 07:32

## 2020-01-25 RX ADMIN — VANCOMYCIN HYDROCHLORIDE SCH MLS/HR: 1.25 INJECTION, POWDER, LYOPHILIZED, FOR SOLUTION INTRAVENOUS at 18:48

## 2020-01-25 RX ADMIN — Medication SCH MMU: at 07:32

## 2020-01-25 RX ADMIN — SODIUM CHLORIDE SCH GM: 0.9 IRRIGANT IRRIGATION at 12:00

## 2020-01-25 RX ADMIN — SODIUM CHLORIDE SCH GM: 0.9 IRRIGANT IRRIGATION at 20:38

## 2020-01-25 RX ADMIN — SODIUM CHLORIDE SCH GM: 0.9 IRRIGANT IRRIGATION at 15:59

## 2020-01-25 RX ADMIN — SODIUM CHLORIDE SCH GM: 0.9 IRRIGANT IRRIGATION at 04:00

## 2020-01-25 RX ADMIN — Medication SCH MMU: at 20:37

## 2020-01-25 RX ADMIN — POTASSIUM CHLORIDE PRN MEQ: 1500 TABLET, EXTENDED RELEASE ORAL at 07:32

## 2020-01-25 RX ADMIN — HYDROCODONE BITARTRATE AND ACETAMINOPHEN PRN TAB: 10; 325 TABLET ORAL at 21:32

## 2020-01-25 RX ADMIN — Medication SCH APPLIC: at 12:56

## 2020-01-25 RX ADMIN — PROPRANOLOL HYDROCHLORIDE SCH MG: 10 TABLET ORAL at 20:00

## 2020-01-25 RX ADMIN — FUROSEMIDE SCH MG: 10 INJECTION, SOLUTION INTRAMUSCULAR; INTRAVENOUS at 20:00

## 2020-01-25 RX ADMIN — FUROSEMIDE SCH MG: 10 INJECTION, SOLUTION INTRAMUSCULAR; INTRAVENOUS at 07:33

## 2020-01-25 RX ADMIN — POTASSIUM CHLORIDE PRN MEQ: 1500 TABLET, EXTENDED RELEASE ORAL at 13:38

## 2020-01-25 RX ADMIN — HYDROCODONE BITARTRATE AND ACETAMINOPHEN PRN TAB: 10; 325 TABLET ORAL at 16:57

## 2020-01-25 RX ADMIN — RIFAXIMIN SCH MG: 550 TABLET ORAL at 20:37

## 2020-01-25 RX ADMIN — PROPRANOLOL HYDROCHLORIDE SCH MG: 10 TABLET ORAL at 07:32

## 2020-01-25 RX ADMIN — SODIUM CHLORIDE SCH GM: 0.9 IRRIGANT IRRIGATION at 07:34

## 2020-01-25 RX ADMIN — VANCOMYCIN HYDROCHLORIDE SCH MLS/HR: 1.25 INJECTION, POWDER, LYOPHILIZED, FOR SOLUTION INTRAVENOUS at 05:54

## 2020-01-25 RX ADMIN — HEPARIN SODIUM SCH UNIT: 5000 INJECTION, SOLUTION INTRAVENOUS; SUBCUTANEOUS at 20:37

## 2020-01-25 RX ADMIN — TAZOBACTAM SODIUM AND PIPERACILLIN SODIUM SCH MLS/HR: 375; 3 INJECTION, SOLUTION INTRAVENOUS at 09:49

## 2020-01-25 NOTE — NUR
Discussed code status with patient. Nurse on duty, attending MD, and son present during 
meeting. MD discussed patient's prognosis being very poor and that medical management would 
be the best route to take now. Patient was counseled on compliance with treatment. MD 
reviewed different advanced directive options with patient and son Meng who is to be the 
patient's power of . Case management is assisting with that process. After the MD's 
full review of patient's current medical standing, what the patient's wishes are in the 
event of respiratory or cardiac arrest, and some counseling between the patient and his son, 
the patient has decided to be DNR at this point in time and has made it clear to all who 
were present during this discussion.

## 2020-01-25 NOTE — NUR
Paged Dr. Christian



PAGER ID: [phone]

MESSAGE: Bertha U [id number]. Butch Sandoval 59 Dx Sepsis. Tele orders have . Can we renew? 
Thanks

-------------------------------------------------------------------------------

Addendum: 20 at 0305 by Bertha Lujan RN

-------------------------------------------------------------------------------

Patient has no cardiac history and has been in SR since admission. Dr. Christian doesn't feel 
telemetry orders needs to be replaced.

## 2020-01-26 VITALS — DIASTOLIC BLOOD PRESSURE: 59 MMHG | SYSTOLIC BLOOD PRESSURE: 102 MMHG

## 2020-01-26 VITALS — SYSTOLIC BLOOD PRESSURE: 128 MMHG | DIASTOLIC BLOOD PRESSURE: 58 MMHG

## 2020-01-26 VITALS — DIASTOLIC BLOOD PRESSURE: 65 MMHG | SYSTOLIC BLOOD PRESSURE: 126 MMHG

## 2020-01-26 VITALS — SYSTOLIC BLOOD PRESSURE: 116 MMHG | DIASTOLIC BLOOD PRESSURE: 72 MMHG

## 2020-01-26 VITALS — DIASTOLIC BLOOD PRESSURE: 67 MMHG | SYSTOLIC BLOOD PRESSURE: 119 MMHG

## 2020-01-26 VITALS — SYSTOLIC BLOOD PRESSURE: 136 MMHG | DIASTOLIC BLOOD PRESSURE: 63 MMHG

## 2020-01-26 LAB
ALBUMIN SERPL BCP-MCNC: 1.6 G/DL (ref 3.4–5)
ALBUMIN/GLOB SERPL: 0.4 {RATIO} (ref 1.1–1.5)
ALP SERPL-CCNC: 75 IU/L (ref 46–116)
ALT SERPL W P-5'-P-CCNC: 28 U/L (ref 12–78)
ANION GAP SERPL CALCULATED.3IONS-SCNC: 8 MMOL/L (ref 8–16)
AST SERPL W P-5'-P-CCNC: 33 U/L (ref 10–37)
BASOPHILS # BLD AUTO: 0.1 X10'3 (ref 0–0.2)
BASOPHILS NFR BLD AUTO: 1.2 % (ref 0–1)
BILIRUB SERPL-MCNC: 1.5 MG/DL (ref 0.1–1)
BUN SERPL-MCNC: 11 MG/DL (ref 7–18)
BUN/CREAT SERPL: 13.4 (ref 5.4–32)
CALCIUM SERPL-MCNC: 6.9 MG/DL (ref 8.5–10.1)
CHLORIDE SERPL-SCNC: 109 MMOL/L (ref 99–107)
CO2 SERPL-SCNC: 24.5 MMOL/L (ref 24–32)
CREAT SERPL-MCNC: 0.82 MG/DL (ref 0.6–1.1)
EOSINOPHIL # BLD AUTO: 0.6 X10'3 (ref 0–0.9)
EOSINOPHIL NFR BLD AUTO: 10.9 % (ref 0–6)
ERYTHROCYTE [DISTWIDTH] IN BLOOD BY AUTOMATED COUNT: 14.6 % (ref 11.5–14.5)
GFR SERPL CREATININE-BSD FRML MDRD: > 90 ML/MIN
GLUCOSE SERPL-MCNC: 141 MG/DL (ref 70–104)
HCT VFR BLD AUTO: 31 % (ref 42–52)
HGB BLD-MCNC: 10.8 G/DL (ref 14–17.9)
LYMPHOCYTES # BLD AUTO: 1.4 X10'3 (ref 1.1–4.8)
LYMPHOCYTES NFR BLD AUTO: 23.3 % (ref 21–51)
MACROCYTES BLD QL SMEAR: (no result)
MAGNESIUM SERPL-MCNC: 1.7 MG/DL (ref 1.5–2.4)
MCH RBC QN AUTO: 36.1 PG (ref 27–31)
MCHC RBC AUTO-ENTMCNC: 35 G/DL (ref 33–36.5)
MCV RBC AUTO: 103.1 FL (ref 78–98)
MONOCYTES # BLD AUTO: 1 X10'3 (ref 0–0.9)
MONOCYTES NFR BLD AUTO: 16.6 % (ref 2–12)
NEUTROPHILS # BLD AUTO: 2.8 X10'3 (ref 1.8–7.7)
NEUTROPHILS NFR BLD AUTO: 48 % (ref 42–75)
PHOSPHATE SERPL-MCNC: 2.2 MG/DL (ref 2.3–4.5)
PLATELET # BLD AUTO: 102 X10'3 (ref 140–440)
PLATELET BLD QL SMEAR: (no result)
PMV BLD AUTO: 8.2 FL (ref 7.4–10.4)
POTASSIUM SERPL-SCNC: 3.2 MMOL/L (ref 3.5–5.1)
PROT SERPL-MCNC: 5.4 G/DL (ref 6.4–8.2)
RBC # BLD AUTO: 3.01 X10'6 (ref 4.7–6.1)
RBC MORPH BLD: (no result)
SODIUM SERPL-SCNC: 141 MMOL/L (ref 135–145)
WBC # BLD AUTO: 5.9 X10'3 (ref 4.5–11)

## 2020-01-26 RX ADMIN — RIFAXIMIN SCH MG: 550 TABLET ORAL at 07:59

## 2020-01-26 RX ADMIN — POTASSIUM CHLORIDE PRN MEQ: 1500 TABLET, EXTENDED RELEASE ORAL at 09:36

## 2020-01-26 RX ADMIN — FUROSEMIDE SCH MG: 10 INJECTION, SOLUTION INTRAMUSCULAR; INTRAVENOUS at 07:48

## 2020-01-26 RX ADMIN — TAZOBACTAM SODIUM AND PIPERACILLIN SODIUM SCH MLS/HR: 375; 3 INJECTION, SOLUTION INTRAVENOUS at 00:52

## 2020-01-26 RX ADMIN — HEPARIN SODIUM SCH UNIT: 5000 INJECTION, SOLUTION INTRAVENOUS; SUBCUTANEOUS at 20:32

## 2020-01-26 RX ADMIN — HYDROCODONE BITARTRATE AND ACETAMINOPHEN PRN TAB: 10; 325 TABLET ORAL at 08:00

## 2020-01-26 RX ADMIN — PROPRANOLOL HYDROCHLORIDE SCH MG: 10 TABLET ORAL at 20:30

## 2020-01-26 RX ADMIN — Medication SCH MMU: at 07:49

## 2020-01-26 RX ADMIN — TAZOBACTAM SODIUM AND PIPERACILLIN SODIUM SCH MLS/HR: 375; 3 INJECTION, SOLUTION INTRAVENOUS at 15:57

## 2020-01-26 RX ADMIN — SODIUM CHLORIDE SCH GM: 0.9 IRRIGANT IRRIGATION at 20:34

## 2020-01-26 RX ADMIN — SODIUM CHLORIDE SCH GM: 0.9 IRRIGANT IRRIGATION at 04:56

## 2020-01-26 RX ADMIN — POTASSIUM CHLORIDE PRN MEQ: 1500 TABLET, EXTENDED RELEASE ORAL at 15:57

## 2020-01-26 RX ADMIN — Medication SCH APPLIC: at 13:51

## 2020-01-26 RX ADMIN — HYDROCODONE BITARTRATE AND ACETAMINOPHEN PRN TAB: 10; 325 TABLET ORAL at 21:08

## 2020-01-26 RX ADMIN — TAZOBACTAM SODIUM AND PIPERACILLIN SODIUM SCH MLS/HR: 375; 3 INJECTION, SOLUTION INTRAVENOUS at 08:00

## 2020-01-26 RX ADMIN — SODIUM CHLORIDE SCH GM: 0.9 IRRIGANT IRRIGATION at 20:00

## 2020-01-26 RX ADMIN — PROPRANOLOL HYDROCHLORIDE SCH MG: 10 TABLET ORAL at 07:49

## 2020-01-26 RX ADMIN — Medication SCH APPLIC: at 21:02

## 2020-01-26 RX ADMIN — HEPARIN SODIUM SCH UNIT: 5000 INJECTION, SOLUTION INTRAVENOUS; SUBCUTANEOUS at 08:02

## 2020-01-26 RX ADMIN — THERA TABS SCH EACH: TAB at 07:49

## 2020-01-26 RX ADMIN — SODIUM CHLORIDE SCH GM: 0.9 IRRIGANT IRRIGATION at 16:03

## 2020-01-26 RX ADMIN — FUROSEMIDE SCH MG: 10 INJECTION, SOLUTION INTRAMUSCULAR; INTRAVENOUS at 20:33

## 2020-01-26 RX ADMIN — SODIUM CHLORIDE SCH GM: 0.9 IRRIGANT IRRIGATION at 00:51

## 2020-01-26 RX ADMIN — SODIUM CHLORIDE SCH GM: 0.9 IRRIGANT IRRIGATION at 07:51

## 2020-01-26 RX ADMIN — VANCOMYCIN HYDROCHLORIDE SCH MLS/HR: 1.25 INJECTION, POWDER, LYOPHILIZED, FOR SOLUTION INTRAVENOUS at 04:56

## 2020-01-26 RX ADMIN — RIFAXIMIN SCH MG: 550 TABLET ORAL at 20:31

## 2020-01-26 RX ADMIN — SODIUM CHLORIDE SCH GM: 0.9 IRRIGANT IRRIGATION at 12:14

## 2020-01-26 RX ADMIN — Medication SCH MMU: at 20:30

## 2020-01-26 RX ADMIN — Medication SCH APPLIC: at 08:02

## 2020-01-26 NOTE — NUR
Patient in room PCU 3012. I have received report from jazz KELLY, had the opportunity to ask 
questions and assume patient care.

## 2020-01-27 VITALS — SYSTOLIC BLOOD PRESSURE: 125 MMHG | DIASTOLIC BLOOD PRESSURE: 63 MMHG

## 2020-01-27 VITALS — DIASTOLIC BLOOD PRESSURE: 60 MMHG | SYSTOLIC BLOOD PRESSURE: 130 MMHG

## 2020-01-27 VITALS — DIASTOLIC BLOOD PRESSURE: 53 MMHG | SYSTOLIC BLOOD PRESSURE: 93 MMHG

## 2020-01-27 VITALS — SYSTOLIC BLOOD PRESSURE: 124 MMHG | DIASTOLIC BLOOD PRESSURE: 70 MMHG

## 2020-01-27 LAB
ALBUMIN SERPL BCP-MCNC: 1.9 G/DL (ref 3.4–5)
ALBUMIN/GLOB SERPL: 0.4 {RATIO} (ref 1.1–1.5)
ALP SERPL-CCNC: 89 IU/L (ref 46–116)
ALT SERPL W P-5'-P-CCNC: 31 U/L (ref 12–78)
ANION GAP SERPL CALCULATED.3IONS-SCNC: 7 MMOL/L (ref 8–16)
AST SERPL W P-5'-P-CCNC: 40 U/L (ref 10–37)
BASOPHILS # BLD AUTO: 0.1 X10'3 (ref 0–0.2)
BASOPHILS NFR BLD AUTO: 1.4 % (ref 0–1)
BILIRUB SERPL-MCNC: 1.5 MG/DL (ref 0.1–1)
BUN SERPL-MCNC: 9 MG/DL (ref 7–18)
BUN/CREAT SERPL: 11.4 (ref 5.4–32)
CALCIUM SERPL-MCNC: 7.4 MG/DL (ref 8.5–10.1)
CHLORIDE SERPL-SCNC: 107 MMOL/L (ref 99–107)
CO2 SERPL-SCNC: 27.1 MMOL/L (ref 24–32)
CREAT SERPL-MCNC: 0.79 MG/DL (ref 0.6–1.1)
EOSINOPHIL # BLD AUTO: 0.8 X10'3 (ref 0–0.9)
EOSINOPHIL NFR BLD AUTO: 11.8 % (ref 0–6)
ERYTHROCYTE [DISTWIDTH] IN BLOOD BY AUTOMATED COUNT: 14.6 % (ref 11.5–14.5)
GFR SERPL CREATININE-BSD FRML MDRD: > 90 ML/MIN
GLUCOSE SERPL-MCNC: 120 MG/DL (ref 70–104)
HCT VFR BLD AUTO: 36 % (ref 42–52)
HGB BLD-MCNC: 12.6 G/DL (ref 14–17.9)
LYMPHOCYTES # BLD AUTO: 1.6 X10'3 (ref 1.1–4.8)
LYMPHOCYTES NFR BLD AUTO: 24.5 % (ref 21–51)
MAGNESIUM SERPL-MCNC: 1.5 MG/DL (ref 1.5–2.4)
MCH RBC QN AUTO: 35.9 PG (ref 27–31)
MCHC RBC AUTO-ENTMCNC: 35 G/DL (ref 33–36.5)
MCV RBC AUTO: 102.5 FL (ref 78–98)
MONOCYTES # BLD AUTO: 0.9 X10'3 (ref 0–0.9)
MONOCYTES NFR BLD AUTO: 14.5 % (ref 2–12)
NEUTROPHILS # BLD AUTO: 3.1 X10'3 (ref 1.8–7.7)
NEUTROPHILS NFR BLD AUTO: 47.8 % (ref 42–75)
PHOSPHATE SERPL-MCNC: 2.8 MG/DL (ref 2.3–4.5)
PLATELET # BLD AUTO: 123 X10'3 (ref 140–440)
PMV BLD AUTO: 8.2 FL (ref 7.4–10.4)
POTASSIUM SERPL-SCNC: 3.2 MMOL/L (ref 3.5–5.1)
PROT SERPL-MCNC: 6.5 G/DL (ref 6.4–8.2)
RBC # BLD AUTO: 3.51 X10'6 (ref 4.7–6.1)
SODIUM SERPL-SCNC: 141 MMOL/L (ref 135–145)
WBC # BLD AUTO: 6.5 X10'3 (ref 4.5–11)

## 2020-01-27 RX ADMIN — PROPRANOLOL HYDROCHLORIDE SCH MG: 10 TABLET ORAL at 07:56

## 2020-01-27 RX ADMIN — TAZOBACTAM SODIUM AND PIPERACILLIN SODIUM SCH MLS/HR: 375; 3 INJECTION, SOLUTION INTRAVENOUS at 00:36

## 2020-01-27 RX ADMIN — SODIUM CHLORIDE SCH GM: 0.9 IRRIGANT IRRIGATION at 00:00

## 2020-01-27 RX ADMIN — HEPARIN SODIUM SCH UNIT: 5000 INJECTION, SOLUTION INTRAVENOUS; SUBCUTANEOUS at 07:56

## 2020-01-27 RX ADMIN — TAZOBACTAM SODIUM AND PIPERACILLIN SODIUM SCH MLS/HR: 375; 3 INJECTION, SOLUTION INTRAVENOUS at 10:31

## 2020-01-27 RX ADMIN — THERA TABS SCH EACH: TAB at 07:56

## 2020-01-27 RX ADMIN — SODIUM CHLORIDE SCH GM: 0.9 IRRIGANT IRRIGATION at 07:56

## 2020-01-27 RX ADMIN — Medication SCH APPLIC: at 07:57

## 2020-01-27 RX ADMIN — Medication SCH APPLIC: at 13:06

## 2020-01-27 RX ADMIN — HYDROCODONE BITARTRATE AND ACETAMINOPHEN PRN TAB: 10; 325 TABLET ORAL at 12:33

## 2020-01-27 RX ADMIN — SODIUM CHLORIDE SCH GM: 0.9 IRRIGANT IRRIGATION at 04:00

## 2020-01-27 RX ADMIN — RIFAXIMIN SCH MG: 550 TABLET ORAL at 07:56

## 2020-01-27 RX ADMIN — Medication SCH MMU: at 07:56

## 2020-01-27 RX ADMIN — SODIUM CHLORIDE SCH GM: 0.9 IRRIGANT IRRIGATION at 12:00

## 2020-01-27 RX ADMIN — FUROSEMIDE SCH MG: 10 INJECTION, SOLUTION INTRAMUSCULAR; INTRAVENOUS at 07:56

## 2020-01-27 NOTE — NUR
Patient in room PCU 3013. I have received report from Raina Moeller had the opportunity to 
ask questions and assume patient care. Patient awake and oriented a thtis time, unlabored 
respirations, offers no complaints, will continue to monitor.

## 2020-01-27 NOTE — NUR
Patient discharged to [facility] in [facility]. PIV removed and all belongings sent with 
patient. Patient was picked up by [facility] medical cab/transport. Wheeled down by 
staff. Discharge packet and instructions were reviewed before signing, medications were 
faxed to pharmacy. Patient was ambulatory at time of discharge and alert and oriented.

## 2020-02-27 ENCOUNTER — HOSPITAL ENCOUNTER (EMERGENCY)
Dept: HOSPITAL 94 - ER | Age: 60
Discharge: HOME | End: 2020-02-27
Payer: MEDICAID

## 2020-02-27 VITALS — BODY MASS INDEX: 27.34 KG/M2 | WEIGHT: 180.38 LBS | HEIGHT: 68 IN

## 2020-02-27 VITALS — SYSTOLIC BLOOD PRESSURE: 146 MMHG | DIASTOLIC BLOOD PRESSURE: 82 MMHG

## 2020-02-27 DIAGNOSIS — E11.9: ICD-10-CM

## 2020-02-27 DIAGNOSIS — Z79.899: ICD-10-CM

## 2020-02-27 DIAGNOSIS — Z59.0: ICD-10-CM

## 2020-02-27 DIAGNOSIS — Z56.0: ICD-10-CM

## 2020-02-27 DIAGNOSIS — L03.115: ICD-10-CM

## 2020-02-27 DIAGNOSIS — Z60.2: ICD-10-CM

## 2020-02-27 DIAGNOSIS — Z79.2: ICD-10-CM

## 2020-02-27 DIAGNOSIS — L03.116: Primary | ICD-10-CM

## 2020-02-27 LAB
ALBUMIN SERPL BCP-MCNC: 2 G/DL (ref 3.4–5)
ALBUMIN/GLOB SERPL: 0.5 {RATIO} (ref 1.1–1.5)
ALP SERPL-CCNC: 127 IU/L (ref 46–116)
ALT SERPL W P-5'-P-CCNC: 73 U/L (ref 12–78)
ANION GAP SERPL CALCULATED.3IONS-SCNC: 10 MMOL/L (ref 8–16)
APTT PPP: 32 SECONDS (ref 22–32)
AST SERPL W P-5'-P-CCNC: 100 U/L (ref 10–37)
BASOPHILS # BLD AUTO: 0.1 X10'3 (ref 0–0.2)
BASOPHILS NFR BLD AUTO: 1.2 % (ref 0–1)
BILIRUB SERPL-MCNC: 1.4 MG/DL (ref 0.1–1)
BUN SERPL-MCNC: 5 MG/DL (ref 7–18)
BUN/CREAT SERPL: 7.1 (ref 5.4–32)
CALCIUM SERPL-MCNC: 8 MG/DL (ref 8.5–10.1)
CHLORIDE SERPL-SCNC: 111 MMOL/L (ref 99–107)
CO2 SERPL-SCNC: 21.1 MMOL/L (ref 24–32)
CREAT SERPL-MCNC: 0.7 MG/DL (ref 0.6–1.1)
EOSINOPHIL # BLD AUTO: 0.4 X10'3 (ref 0–0.9)
EOSINOPHIL NFR BLD AUTO: 6.9 % (ref 0–6)
ERYTHROCYTE [DISTWIDTH] IN BLOOD BY AUTOMATED COUNT: 14.1 % (ref 11.5–14.5)
GFR SERPL CREATININE-BSD FRML MDRD: > 90 ML/MIN
GLUCOSE SERPL-MCNC: 148 MG/DL (ref 70–104)
HCT VFR BLD AUTO: 33.1 % (ref 42–52)
HGB BLD-MCNC: 11.8 G/DL (ref 14–17.9)
LYMPHOCYTES # BLD AUTO: 1.9 X10'3 (ref 1.1–4.8)
LYMPHOCYTES NFR BLD AUTO: 35 % (ref 21–51)
MAGNESIUM SERPL-MCNC: 1.7 MG/DL (ref 1.5–2.4)
MCH RBC QN AUTO: 35.4 PG (ref 27–31)
MCHC RBC AUTO-ENTMCNC: 35.6 G/DL (ref 33–36.5)
MCV RBC AUTO: 99.5 FL (ref 78–98)
MONOCYTES # BLD AUTO: 0.7 X10'3 (ref 0–0.9)
MONOCYTES NFR BLD AUTO: 12.4 % (ref 2–12)
NEUTROPHILS # BLD AUTO: 2.5 X10'3 (ref 1.8–7.7)
NEUTROPHILS NFR BLD AUTO: 44.5 % (ref 42–75)
PLATELET # BLD AUTO: 86 X10'3 (ref 140–440)
PMV BLD AUTO: 8.6 FL (ref 7.4–10.4)
POTASSIUM SERPL-SCNC: 3.1 MMOL/L (ref 3.5–5.1)
PROT SERPL-MCNC: 5.9 G/DL (ref 6.4–8.2)
RBC # BLD AUTO: 3.32 X10'6 (ref 4.7–6.1)
SODIUM SERPL-SCNC: 142 MMOL/L (ref 135–145)
WBC # BLD AUTO: 5.6 X10'3 (ref 4.5–11)

## 2020-02-27 PROCEDURE — 96372 THER/PROPH/DIAG INJ SC/IM: CPT

## 2020-02-27 PROCEDURE — 84145 PROCALCITONIN (PCT): CPT

## 2020-02-27 PROCEDURE — 85730 THROMBOPLASTIN TIME PARTIAL: CPT

## 2020-02-27 PROCEDURE — 80053 COMPREHEN METABOLIC PANEL: CPT

## 2020-02-27 PROCEDURE — 71045 X-RAY EXAM CHEST 1 VIEW: CPT

## 2020-02-27 PROCEDURE — 83735 ASSAY OF MAGNESIUM: CPT

## 2020-02-27 PROCEDURE — 85610 PROTHROMBIN TIME: CPT

## 2020-02-27 PROCEDURE — 36415 COLL VENOUS BLD VENIPUNCTURE: CPT

## 2020-02-27 PROCEDURE — 85025 COMPLETE CBC W/AUTO DIFF WBC: CPT

## 2020-02-27 PROCEDURE — 87040 BLOOD CULTURE FOR BACTERIA: CPT

## 2020-02-27 PROCEDURE — 99285 EMERGENCY DEPT VISIT HI MDM: CPT

## 2020-02-27 PROCEDURE — 93005 ELECTROCARDIOGRAM TRACING: CPT

## 2020-02-27 PROCEDURE — 83605 ASSAY OF LACTIC ACID: CPT

## 2020-02-27 SDOH — SOCIAL STABILITY - SOCIAL INSECURITY: PROBLEMS RELATED TO LIVING ALONE: Z60.2

## 2020-02-27 SDOH — ECONOMIC STABILITY - INCOME SECURITY: UNEMPLOYMENT, UNSPECIFIED: Z56.0

## 2020-02-27 SDOH — ECONOMIC STABILITY - HOUSING INSECURITY: HOMELESSNESS: Z59.0

## 2020-03-13 ENCOUNTER — HOSPITAL ENCOUNTER (EMERGENCY)
Dept: HOSPITAL 94 - ER | Age: 60
Discharge: HOME | End: 2020-03-13
Payer: MEDICAID

## 2020-03-13 VITALS — SYSTOLIC BLOOD PRESSURE: 123 MMHG | DIASTOLIC BLOOD PRESSURE: 50 MMHG

## 2020-03-13 VITALS — WEIGHT: 188.27 LBS | HEIGHT: 68 IN | BODY MASS INDEX: 28.53 KG/M2

## 2020-03-13 DIAGNOSIS — Z59.0: ICD-10-CM

## 2020-03-13 DIAGNOSIS — E11.9: ICD-10-CM

## 2020-03-13 DIAGNOSIS — Z60.2: ICD-10-CM

## 2020-03-13 DIAGNOSIS — R60.0: Primary | ICD-10-CM

## 2020-03-13 DIAGNOSIS — Z79.899: ICD-10-CM

## 2020-03-13 DIAGNOSIS — Z56.0: ICD-10-CM

## 2020-03-13 PROCEDURE — 99284 EMERGENCY DEPT VISIT MOD MDM: CPT

## 2020-03-13 SDOH — ECONOMIC STABILITY - HOUSING INSECURITY: HOMELESSNESS: Z59.0

## 2020-03-13 SDOH — ECONOMIC STABILITY - INCOME SECURITY: UNEMPLOYMENT, UNSPECIFIED: Z56.0

## 2020-03-13 SDOH — SOCIAL STABILITY - SOCIAL INSECURITY: PROBLEMS RELATED TO LIVING ALONE: Z60.2

## 2020-06-25 ENCOUNTER — HOSPITAL ENCOUNTER (INPATIENT)
Dept: HOSPITAL 94 - ER | Age: 60
LOS: 3 days | Discharge: HOME | DRG: 720 | End: 2020-06-28
Attending: HOSPITALIST | Admitting: INTERNAL MEDICINE
Payer: MEDICAID

## 2020-06-25 VITALS — SYSTOLIC BLOOD PRESSURE: 115 MMHG | DIASTOLIC BLOOD PRESSURE: 72 MMHG

## 2020-06-25 VITALS — HEIGHT: 68 IN | WEIGHT: 171.96 LBS | BODY MASS INDEX: 26.06 KG/M2

## 2020-06-25 DIAGNOSIS — K86.89: ICD-10-CM

## 2020-06-25 DIAGNOSIS — Z59.0: ICD-10-CM

## 2020-06-25 DIAGNOSIS — G93.41: ICD-10-CM

## 2020-06-25 DIAGNOSIS — Z79.899: ICD-10-CM

## 2020-06-25 DIAGNOSIS — E88.09: ICD-10-CM

## 2020-06-25 DIAGNOSIS — K72.90: ICD-10-CM

## 2020-06-25 DIAGNOSIS — E86.0: ICD-10-CM

## 2020-06-25 DIAGNOSIS — N18.6: ICD-10-CM

## 2020-06-25 DIAGNOSIS — T67.01XA: ICD-10-CM
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
## 2020-06-25 DIAGNOSIS — I89.0: ICD-10-CM

## 2020-06-25 DIAGNOSIS — D53.9: ICD-10-CM

## 2020-06-25 DIAGNOSIS — F15.10: ICD-10-CM

## 2020-06-25 DIAGNOSIS — Z91.19: ICD-10-CM

## 2020-06-25 DIAGNOSIS — A41.9: Primary | ICD-10-CM

## 2020-06-25 DIAGNOSIS — N17.9: ICD-10-CM

## 2020-06-25 DIAGNOSIS — D69.59: ICD-10-CM

## 2020-06-25 DIAGNOSIS — K70.30: ICD-10-CM

## 2020-06-25 DIAGNOSIS — L40.9: ICD-10-CM

## 2020-06-25 DIAGNOSIS — L03.119: ICD-10-CM

## 2020-06-25 DIAGNOSIS — F17.210: ICD-10-CM

## 2020-06-25 DIAGNOSIS — Z20.828: ICD-10-CM

## 2020-06-25 DIAGNOSIS — E11.22: ICD-10-CM

## 2020-06-25 LAB
ALBUMIN SERPL BCP-MCNC: 2.1 G/DL (ref 3.4–5)
ALBUMIN/GLOB SERPL: 0.5 {RATIO} (ref 1.1–1.5)
ALP SERPL-CCNC: 105 IU/L (ref 46–116)
ALT SERPL W P-5'-P-CCNC: 61 U/L (ref 12–78)
AMPHETAMINES UR QL SCN: POSITIVE
ANION GAP SERPL CALCULATED.3IONS-SCNC: 9 MMOL/L (ref 8–16)
AST SERPL W P-5'-P-CCNC: 86 U/L (ref 10–37)
BACTERIA URNS QL MICRO: (no result) /HPF
BARBITURATES UR QL SCN: NEGATIVE
BASOPHILS # BLD AUTO: 0 X10'3 (ref 0–0.2)
BASOPHILS NFR BLD AUTO: 0 % (ref 0–1)
BENZODIAZ UR QL SCN: NEGATIVE
BILIRUB SERPL-MCNC: 2.4 MG/DL (ref 0.1–1)
BUN SERPL-MCNC: 29 MG/DL (ref 7–18)
BUN/CREAT SERPL: 20.9 (ref 5.4–32)
BZE UR QL SCN: NEGATIVE
CALCIUM SERPL-MCNC: 7.9 MG/DL (ref 8.5–10.1)
CANNABINOIDS UR QL SCN: NEGATIVE
CHLORIDE SERPL-SCNC: 103 MMOL/L (ref 99–107)
CK SERPL-CCNC: 415 U/L (ref 39–308)
CLARITY UR: CLEAR
CO2 SERPL-SCNC: 21.7 MMOL/L (ref 24–32)
COLOR UR: YELLOW
CREAT SERPL-MCNC: 1.39 MG/DL (ref 0.6–1.1)
DEPRECATED SQUAMOUS URNS QL MICRO: (no result) /LPF
EOSINOPHIL # BLD AUTO: 0 X10'3 (ref 0–0.9)
EOSINOPHIL NFR BLD AUTO: 0.1 % (ref 0–6)
EOSINOPHIL NFR BLD MANUAL: 1 % (ref 0–6)
ERYTHROCYTE [DISTWIDTH] IN BLOOD BY AUTOMATED COUNT: 14.8 % (ref 11.5–14.5)
ETHANOL SERPL-MCNC: < 0.01 GM/DL (ref 0–0.01)
GFR SERPL CREATININE-BSD FRML MDRD: 52 ML/MIN
GLUCOSE SERPL-MCNC: 311 MG/DL (ref 70–104)
GLUCOSE UR STRIP-MCNC: 500 MG/DL
HBA1C MFR BLD: 7.4 % (ref 4.5–6.2)
HCT VFR BLD AUTO: 38 % (ref 42–52)
HGB BLD-MCNC: 13 G/DL (ref 14–17.9)
HGB UR QL STRIP: (no result)
HYALINE CASTS URNS QL MICRO: (no result) /LPF
KETONES UR STRIP-MCNC: NEGATIVE MG/DL
LEUKOCYTE ESTERASE UR QL STRIP: NEGATIVE
LG PLATELETS BLD QL SMEAR: (no result)
LYMPHOCYTES # BLD AUTO: 0.6 X10'3 (ref 1.1–4.8)
LYMPHOCYTES NFR BLD AUTO: 3.6 % (ref 21–51)
LYMPHOCYTES NFR BLD MANUAL: 2 % (ref 21–51)
MACROCYTES BLD QL SMEAR: (no result)
MCH RBC QN AUTO: 34.8 PG (ref 27–31)
MCHC RBC AUTO-ENTMCNC: 34.3 G/DL (ref 33–36.5)
MCV RBC AUTO: 101.3 FL (ref 78–98)
METHADONE UR QL SCN: NEGATIVE
MONOCYTES # BLD AUTO: 0.9 X10'3 (ref 0–0.9)
MONOCYTES NFR BLD AUTO: 5.7 % (ref 2–12)
MONOCYTES NFR BLD MANUAL: 4 % (ref 2–12)
MUCOUS THREADS URNS QL MICRO: (no result) /LPF
NEUTROPHILS # BLD AUTO: 14.8 X10'3 (ref 1.8–7.7)
NEUTROPHILS NFR BLD AUTO: 90.6 % (ref 42–75)
NEUTS BAND # BLD MANUAL: 12 % (ref 0–10)
NEUTS SEG NFR BLD MANUAL: 81 % (ref 42–75)
NITRITE UR QL STRIP: NEGATIVE
OPIATES UR QL SCN: NEGATIVE
PCP UR QL SCN: NEGATIVE
PH UR STRIP: 5.5 [PH] (ref 4.8–8)
PLATELET # BLD AUTO: 92 X10'3 (ref 140–440)
PLATELET BLD QL SMEAR: (no result)
PMV BLD AUTO: 8.7 FL (ref 7.4–10.4)
POTASSIUM SERPL-SCNC: 4 MMOL/L (ref 3.5–5.1)
PROT SERPL-MCNC: 6.3 G/DL (ref 6.4–8.2)
PROT UR QL STRIP: NEGATIVE MG/DL
RBC # BLD AUTO: 3.75 X10'6 (ref 4.7–6.1)
RBC #/AREA URNS HPF: (no result) /HPF (ref 0–2)
RBC MORPH BLD: (no result)
SODIUM SERPL-SCNC: 134 MMOL/L (ref 135–145)
SP GR UR STRIP: 1.02 (ref 1–1.03)
TOTAL CELLS COUNTED FLD: 100
TRANS CELLS URNS QL MICRO: (no result) /HPF
TROPONIN I SERPL-MCNC: < 0.04 NG/ML (ref 0–0.05)
URN COLLECT METHOD CLASS: (no result)
UROBILINOGEN UR STRIP-MCNC: 0.2 E.U/DL (ref 0.2–1)
WBC # BLD AUTO: 16.3 X10'3 (ref 4.5–11)
WBC #/AREA URNS HPF: (no result) /HPF (ref 0–4)

## 2020-06-25 PROCEDURE — 82150 ASSAY OF AMYLASE: CPT

## 2020-06-25 PROCEDURE — 84484 ASSAY OF TROPONIN QUANT: CPT

## 2020-06-25 PROCEDURE — 93005 ELECTROCARDIOGRAM TRACING: CPT

## 2020-06-25 PROCEDURE — 96365 THER/PROPH/DIAG IV INF INIT: CPT

## 2020-06-25 PROCEDURE — 97116 GAIT TRAINING THERAPY: CPT

## 2020-06-25 PROCEDURE — 97530 THERAPEUTIC ACTIVITIES: CPT

## 2020-06-25 PROCEDURE — 84100 ASSAY OF PHOSPHORUS: CPT

## 2020-06-25 PROCEDURE — 96368 THER/DIAG CONCURRENT INF: CPT

## 2020-06-25 PROCEDURE — 82550 ASSAY OF CK (CPK): CPT

## 2020-06-25 PROCEDURE — 81001 URINALYSIS AUTO W/SCOPE: CPT

## 2020-06-25 PROCEDURE — 85025 COMPLETE CBC W/AUTO DIFF WBC: CPT

## 2020-06-25 PROCEDURE — 99292 CRITICAL CARE ADDL 30 MIN: CPT

## 2020-06-25 PROCEDURE — 83605 ASSAY OF LACTIC ACID: CPT

## 2020-06-25 PROCEDURE — 87635 SARS-COV-2 COVID-19 AMP PRB: CPT

## 2020-06-25 PROCEDURE — 80053 COMPREHEN METABOLIC PANEL: CPT

## 2020-06-25 PROCEDURE — 70450 CT HEAD/BRAIN W/O DYE: CPT

## 2020-06-25 PROCEDURE — 76937 US GUIDE VASCULAR ACCESS: CPT

## 2020-06-25 PROCEDURE — 83036 HEMOGLOBIN GLYCOSYLATED A1C: CPT

## 2020-06-25 PROCEDURE — 36415 COLL VENOUS BLD VENIPUNCTURE: CPT

## 2020-06-25 PROCEDURE — 97161 PT EVAL LOW COMPLEX 20 MIN: CPT

## 2020-06-25 PROCEDURE — 83690 ASSAY OF LIPASE: CPT

## 2020-06-25 PROCEDURE — 84145 PROCALCITONIN (PCT): CPT

## 2020-06-25 PROCEDURE — 80305 DRUG TEST PRSMV DIR OPT OBS: CPT

## 2020-06-25 PROCEDURE — 99291 CRITICAL CARE FIRST HOUR: CPT

## 2020-06-25 PROCEDURE — 82140 ASSAY OF AMMONIA: CPT

## 2020-06-25 PROCEDURE — 83735 ASSAY OF MAGNESIUM: CPT

## 2020-06-25 PROCEDURE — 80320 DRUG SCREEN QUANTALCOHOLS: CPT

## 2020-06-25 PROCEDURE — 82948 REAGENT STRIP/BLOOD GLUCOSE: CPT

## 2020-06-25 PROCEDURE — 85610 PROTHROMBIN TIME: CPT

## 2020-06-25 PROCEDURE — 71045 X-RAY EXAM CHEST 1 VIEW: CPT

## 2020-06-25 PROCEDURE — 97112 NEUROMUSCULAR REEDUCATION: CPT

## 2020-06-25 PROCEDURE — 87081 CULTURE SCREEN ONLY: CPT

## 2020-06-25 PROCEDURE — 87040 BLOOD CULTURE FOR BACTERIA: CPT

## 2020-06-25 RX ADMIN — INSULIN GLARGINE SCH UNIT: 100 INJECTION, SOLUTION SUBCUTANEOUS at 21:36

## 2020-06-25 RX ADMIN — HEPARIN SODIUM SCH UNIT: 5000 INJECTION, SOLUTION INTRAVENOUS; SUBCUTANEOUS at 21:10

## 2020-06-25 RX ADMIN — SODIUM CHLORIDE SCH GM: 0.9 IRRIGANT IRRIGATION at 21:08

## 2020-06-25 RX ADMIN — DOCUSATE SODIUM SCH MG: 100 CAPSULE, LIQUID FILLED ORAL at 21:10

## 2020-06-25 RX ADMIN — RIFAXIMIN SCH MG: 550 TABLET ORAL at 21:10

## 2020-06-25 SDOH — ECONOMIC STABILITY - HOUSING INSECURITY: HOMELESSNESS: Z59.0

## 2020-06-25 NOTE — NUR
Patient in room ED 13. I have received report from  Brittanie in the ED  and had the opportunity 
to ask questions and assume patient care.

## 2020-06-25 NOTE — NUR
ATTEMPTING TO START IV WITHOUT SUCCESS AFTER SEVERAL ATTEMPTS. ER NURSE 
ATTEMPTING IV START WITH US GUIDED DEVICE. PATIENT IS HOT AND DRY TO TOUCH. 
MINIMALLY VERBAL.

## 2020-06-25 NOTE — NUR
notified MD that Lab was unable to draw patient.  Labs reordered for AM.  She stated to let 
AM nurse know to notify AM Hospitalist.

## 2020-06-25 NOTE — NUR
Problems reprioritized. Patient report given, questions answered & plan of care reviewed 
with Tonia.

## 2020-06-26 VITALS — DIASTOLIC BLOOD PRESSURE: 62 MMHG | SYSTOLIC BLOOD PRESSURE: 118 MMHG

## 2020-06-26 VITALS — DIASTOLIC BLOOD PRESSURE: 59 MMHG | SYSTOLIC BLOOD PRESSURE: 105 MMHG

## 2020-06-26 VITALS — DIASTOLIC BLOOD PRESSURE: 54 MMHG | SYSTOLIC BLOOD PRESSURE: 122 MMHG

## 2020-06-26 VITALS — DIASTOLIC BLOOD PRESSURE: 54 MMHG | SYSTOLIC BLOOD PRESSURE: 93 MMHG

## 2020-06-26 LAB
ALBUMIN SERPL BCP-MCNC: 1.8 G/DL (ref 3.4–5)
ALBUMIN/GLOB SERPL: 0.5 {RATIO} (ref 1.1–1.5)
ALP SERPL-CCNC: 76 IU/L (ref 46–116)
ALT SERPL W P-5'-P-CCNC: 44 U/L (ref 12–78)
AMYLASE SERPL-CCNC: 41 U/L (ref 25–115)
ANION GAP SERPL CALCULATED.3IONS-SCNC: 7 MMOL/L (ref 8–16)
AST SERPL W P-5'-P-CCNC: 58 U/L (ref 10–37)
BASOPHILS # BLD AUTO: 0.1 X10'3 (ref 0–0.2)
BASOPHILS NFR BLD AUTO: 0.9 % (ref 0–1)
BILIRUB SERPL-MCNC: 2.1 MG/DL (ref 0.1–1)
BUN SERPL-MCNC: 17 MG/DL (ref 7–18)
BUN/CREAT SERPL: 22.1 (ref 5.4–32)
CALCIUM SERPL-MCNC: 7.5 MG/DL (ref 8.5–10.1)
CHLORIDE SERPL-SCNC: 106 MMOL/L (ref 99–107)
CO2 SERPL-SCNC: 22.9 MMOL/L (ref 24–32)
CREAT SERPL-MCNC: 0.77 MG/DL (ref 0.6–1.1)
EOSINOPHIL # BLD AUTO: 0.2 X10'3 (ref 0–0.9)
EOSINOPHIL NFR BLD AUTO: 2.1 % (ref 0–6)
EOSINOPHIL NFR BLD MANUAL: 1 % (ref 0–6)
ERYTHROCYTE [DISTWIDTH] IN BLOOD BY AUTOMATED COUNT: 14.8 % (ref 11.5–14.5)
GFR SERPL CREATININE-BSD FRML MDRD: > 90 ML/MIN
GLUCOSE SERPL-MCNC: 127 MG/DL (ref 70–104)
HCT VFR BLD AUTO: 34.1 % (ref 42–52)
HGB BLD-MCNC: 11.9 G/DL (ref 14–17.9)
LIPASE SERPL-CCNC: 175 U/L (ref 73–393)
LYMPHOCYTES # BLD AUTO: 1.8 X10'3 (ref 1.1–4.8)
LYMPHOCYTES NFR BLD AUTO: 15.4 % (ref 21–51)
LYMPHOCYTES NFR BLD MANUAL: 21 % (ref 21–51)
MACROCYTES BLD QL SMEAR: (no result)
MAGNESIUM SERPL-MCNC: 2.2 MG/DL (ref 1.5–2.4)
MCH RBC QN AUTO: 35.4 PG (ref 27–31)
MCHC RBC AUTO-ENTMCNC: 34.8 G/DL (ref 33–36.5)
MCV RBC AUTO: 101.8 FL (ref 78–98)
METAMYELOCYTES NFR BLD MANUAL: 1 % (ref 0–0)
MONOCYTES # BLD AUTO: 1.4 X10'3 (ref 0–0.9)
MONOCYTES NFR BLD AUTO: 12.2 % (ref 2–12)
MONOCYTES NFR BLD MANUAL: 9 % (ref 2–12)
NEUTROPHILS # BLD AUTO: 8.2 X10'3 (ref 1.8–7.7)
NEUTROPHILS NFR BLD AUTO: 69.4 % (ref 42–75)
NEUTS BAND # BLD MANUAL: 1 % (ref 0–10)
NEUTS SEG NFR BLD MANUAL: 67 % (ref 42–75)
PHOSPHATE SERPL-MCNC: 1.9 MG/DL (ref 2.3–4.5)
PLATELET # BLD AUTO: 80 X10'3 (ref 140–440)
PLATELET BLD QL SMEAR: (no result)
PMV BLD AUTO: 8.5 FL (ref 7.4–10.4)
POTASSIUM SERPL-SCNC: 3.9 MMOL/L (ref 3.5–5.1)
PROT SERPL-MCNC: 5.5 G/DL (ref 6.4–8.2)
RBC # BLD AUTO: 3.35 X10'6 (ref 4.7–6.1)
RBC MORPH BLD: (no result)
SMUDGE CELLS BLD QL SMEAR: (no result)
SODIUM SERPL-SCNC: 136 MMOL/L (ref 135–145)
TOTAL CELLS COUNTED FLD: 100
WBC # BLD AUTO: 11.7 X10'3 (ref 4.5–11)

## 2020-06-26 RX ADMIN — THERA TABS SCH EACH: TAB at 07:53

## 2020-06-26 RX ADMIN — SODIUM CHLORIDE SCH GM: 0.9 IRRIGANT IRRIGATION at 19:55

## 2020-06-26 RX ADMIN — DOCUSATE SODIUM SCH MG: 100 CAPSULE, LIQUID FILLED ORAL at 19:55

## 2020-06-26 RX ADMIN — DOCUSATE SODIUM SCH MG: 100 CAPSULE, LIQUID FILLED ORAL at 08:00

## 2020-06-26 RX ADMIN — INSULIN GLARGINE SCH UNIT: 100 INJECTION, SOLUTION SUBCUTANEOUS at 21:40

## 2020-06-26 RX ADMIN — INSULIN LISPRO SCH UNITS: 100 INJECTION, SOLUTION INTRAVENOUS; SUBCUTANEOUS at 14:09

## 2020-06-26 RX ADMIN — RIFAXIMIN SCH MG: 550 TABLET ORAL at 07:53

## 2020-06-26 RX ADMIN — INSULIN LISPRO SCH UNITS: 100 INJECTION, SOLUTION INTRAVENOUS; SUBCUTANEOUS at 20:09

## 2020-06-26 RX ADMIN — HEPARIN SODIUM SCH UNIT: 5000 INJECTION, SOLUTION INTRAVENOUS; SUBCUTANEOUS at 08:00

## 2020-06-26 RX ADMIN — SODIUM CHLORIDE SCH GM: 0.9 IRRIGANT IRRIGATION at 07:53

## 2020-06-26 RX ADMIN — Medication SCH MMU: at 19:55

## 2020-06-26 RX ADMIN — HEPARIN SODIUM SCH UNIT: 5000 INJECTION, SOLUTION INTRAVENOUS; SUBCUTANEOUS at 20:00

## 2020-06-26 RX ADMIN — RIFAXIMIN SCH MG: 550 TABLET ORAL at 19:55

## 2020-06-26 NOTE — NUR
DM consult: Pt with A1c 7.4%, up from 5.4% in January of this year per records. Per PMH pt 
is a poor historian. Pt admit with ALOC, currently documented as A/O x 4 in physical 
assessment however per SW note today pt presents with confusion and agitation. DM education 
deferred at this time. Pt currently receiving routine Thiamine, Folic acid, and MVI for EtOH 
hx. Documented 100% PO intake at breakfast this morning on CHO controlled diet meeting 
nutrient needs. Will continue to follow.

-------------------------------------------------------------------------------

Addendum: 06/26/20 at 1201 by Jenny Dawn RD

-------------------------------------------------------------------------------

Amended: Links added.

## 2020-06-26 NOTE — NUR
Problems reprioritized. Patient report given, questions answered & plan of care reviewed 
with ROBIN Rae.

## 2020-06-26 NOTE — NUR
Problems reprioritized. Patient report given, questions answered & plan of care reviewed 
with ROBIN Rose.

## 2020-06-27 VITALS — DIASTOLIC BLOOD PRESSURE: 53 MMHG | SYSTOLIC BLOOD PRESSURE: 120 MMHG

## 2020-06-27 VITALS — SYSTOLIC BLOOD PRESSURE: 137 MMHG | DIASTOLIC BLOOD PRESSURE: 55 MMHG

## 2020-06-27 VITALS — DIASTOLIC BLOOD PRESSURE: 71 MMHG | SYSTOLIC BLOOD PRESSURE: 120 MMHG

## 2020-06-27 VITALS — SYSTOLIC BLOOD PRESSURE: 115 MMHG | DIASTOLIC BLOOD PRESSURE: 62 MMHG

## 2020-06-27 LAB
ALBUMIN SERPL BCP-MCNC: 1.8 G/DL (ref 3.4–5)
ALBUMIN/GLOB SERPL: 0.5 {RATIO} (ref 1.1–1.5)
ALP SERPL-CCNC: 76 IU/L (ref 46–116)
ALT SERPL W P-5'-P-CCNC: 38 U/L (ref 12–78)
AMYLASE SERPL-CCNC: 46 U/L (ref 25–115)
ANION GAP SERPL CALCULATED.3IONS-SCNC: 6 MMOL/L (ref 8–16)
AST SERPL W P-5'-P-CCNC: 49 U/L (ref 10–37)
BASOPHILS # BLD AUTO: 0.1 X10'3 (ref 0–0.2)
BASOPHILS NFR BLD AUTO: 1.2 % (ref 0–1)
BILIRUB SERPL-MCNC: 1.9 MG/DL (ref 0.1–1)
BUN SERPL-MCNC: 11 MG/DL (ref 7–18)
BUN/CREAT SERPL: 15.5 (ref 5.4–32)
CALCIUM SERPL-MCNC: 7.5 MG/DL (ref 8.5–10.1)
CHLORIDE SERPL-SCNC: 107 MMOL/L (ref 99–107)
CO2 SERPL-SCNC: 25.3 MMOL/L (ref 24–32)
CREAT SERPL-MCNC: 0.71 MG/DL (ref 0.6–1.1)
EOSINOPHIL # BLD AUTO: 0.3 X10'3 (ref 0–0.9)
EOSINOPHIL NFR BLD AUTO: 4.2 % (ref 0–6)
ERYTHROCYTE [DISTWIDTH] IN BLOOD BY AUTOMATED COUNT: 15.2 % (ref 11.5–14.5)
GFR SERPL CREATININE-BSD FRML MDRD: > 90 ML/MIN
GLUCOSE SERPL-MCNC: 124 MG/DL (ref 70–104)
HCT VFR BLD AUTO: 34.5 % (ref 42–52)
HGB BLD-MCNC: 12 G/DL (ref 14–17.9)
LIPASE SERPL-CCNC: 186 U/L (ref 73–393)
LYMPHOCYTES # BLD AUTO: 1.7 X10'3 (ref 1.1–4.8)
LYMPHOCYTES NFR BLD AUTO: 21.9 % (ref 21–51)
MAGNESIUM SERPL-MCNC: 1.6 MG/DL (ref 1.5–2.4)
MCH RBC QN AUTO: 34.6 PG (ref 27–31)
MCHC RBC AUTO-ENTMCNC: 34.8 G/DL (ref 33–36.5)
MCV RBC AUTO: 99.5 FL (ref 78–98)
MONOCYTES # BLD AUTO: 1.1 X10'3 (ref 0–0.9)
MONOCYTES NFR BLD AUTO: 13.8 % (ref 2–12)
NEUTROPHILS # BLD AUTO: 4.6 X10'3 (ref 1.8–7.7)
NEUTROPHILS NFR BLD AUTO: 58.9 % (ref 42–75)
PHOSPHATE SERPL-MCNC: 3.6 MG/DL (ref 2.3–4.5)
PLATELET # BLD AUTO: 84 X10'3 (ref 140–440)
PMV BLD AUTO: 8.3 FL (ref 7.4–10.4)
POTASSIUM SERPL-SCNC: 3.5 MMOL/L (ref 3.5–5.1)
PROT SERPL-MCNC: 5.5 G/DL (ref 6.4–8.2)
RBC # BLD AUTO: 3.47 X10'6 (ref 4.7–6.1)
SODIUM SERPL-SCNC: 138 MMOL/L (ref 135–145)
WBC # BLD AUTO: 7.8 X10'3 (ref 4.5–11)

## 2020-06-27 RX ADMIN — SODIUM CHLORIDE SCH GM: 0.9 IRRIGANT IRRIGATION at 21:53

## 2020-06-27 RX ADMIN — SODIUM CHLORIDE SCH GM: 0.9 IRRIGANT IRRIGATION at 20:00

## 2020-06-27 RX ADMIN — DOCUSATE SODIUM SCH MG: 100 CAPSULE, LIQUID FILLED ORAL at 21:52

## 2020-06-27 RX ADMIN — INSULIN GLARGINE SCH UNIT: 100 INJECTION, SOLUTION SUBCUTANEOUS at 21:57

## 2020-06-27 RX ADMIN — THERA TABS SCH EACH: TAB at 08:24

## 2020-06-27 RX ADMIN — Medication SCH MMU: at 08:20

## 2020-06-27 RX ADMIN — RIFAXIMIN SCH MG: 550 TABLET ORAL at 21:52

## 2020-06-27 RX ADMIN — Medication SCH MMU: at 21:52

## 2020-06-27 RX ADMIN — HEPARIN SODIUM SCH UNIT: 5000 INJECTION, SOLUTION INTRAVENOUS; SUBCUTANEOUS at 20:00

## 2020-06-27 RX ADMIN — RIFAXIMIN SCH MG: 550 TABLET ORAL at 08:25

## 2020-06-27 RX ADMIN — INSULIN LISPRO SCH UNITS: 100 INJECTION, SOLUTION INTRAVENOUS; SUBCUTANEOUS at 13:43

## 2020-06-27 RX ADMIN — DOCUSATE SODIUM SCH MG: 100 CAPSULE, LIQUID FILLED ORAL at 08:19

## 2020-06-27 RX ADMIN — HEPARIN SODIUM SCH UNIT: 5000 INJECTION, SOLUTION INTRAVENOUS; SUBCUTANEOUS at 08:00

## 2020-06-27 RX ADMIN — INSULIN LISPRO SCH UNITS: 100 INJECTION, SOLUTION INTRAVENOUS; SUBCUTANEOUS at 09:39

## 2020-06-27 RX ADMIN — SODIUM CHLORIDE SCH GM: 0.9 IRRIGANT IRRIGATION at 08:18

## 2020-06-27 NOTE — NUR
Patient in room ORTHO 4011. I have received report from Erika KELLY and had the opportunity to 
ask questions and assume patient care.

## 2020-06-27 NOTE — NUR
Problems reprioritized. Patient report given, questions answered & plan of care reviewed 
with ROBIN James.

## 2020-06-27 NOTE — NUR
Patient in room ORTHO 4011. I have received report from Tonia   and had the opportunity to 
ask questions and assume patient care.

## 2020-06-27 NOTE — NUR
PAGER ID: [phone]

MESSAGE: Erika Lynn on neuro, Mr. Sandoval in 4011B had only an occasional instence of 
bradycardia (58), SR, no ectopy Thank you

## 2020-06-28 VITALS — DIASTOLIC BLOOD PRESSURE: 63 MMHG | SYSTOLIC BLOOD PRESSURE: 120 MMHG

## 2020-06-28 VITALS — SYSTOLIC BLOOD PRESSURE: 116 MMHG | DIASTOLIC BLOOD PRESSURE: 58 MMHG

## 2020-06-28 LAB
ALBUMIN SERPL BCP-MCNC: 1.9 G/DL (ref 3.4–5)
ALBUMIN/GLOB SERPL: 0.5 {RATIO} (ref 1.1–1.5)
ALP SERPL-CCNC: 82 IU/L (ref 46–116)
ALT SERPL W P-5'-P-CCNC: 37 U/L (ref 12–78)
AMYLASE SERPL-CCNC: 50 U/L (ref 25–115)
ANION GAP SERPL CALCULATED.3IONS-SCNC: 9 MMOL/L (ref 8–16)
AST SERPL W P-5'-P-CCNC: 53 U/L (ref 10–37)
BASOPHILS # BLD AUTO: 0.1 X10'3 (ref 0–0.2)
BASOPHILS NFR BLD AUTO: 1.2 % (ref 0–1)
BILIRUB SERPL-MCNC: 2.4 MG/DL (ref 0.1–1)
BUN SERPL-MCNC: 9 MG/DL (ref 7–18)
BUN/CREAT SERPL: 14.8 (ref 5.4–32)
CALCIUM SERPL-MCNC: 7.9 MG/DL (ref 8.5–10.1)
CHLORIDE SERPL-SCNC: 107 MMOL/L (ref 99–107)
CO2 SERPL-SCNC: 24.4 MMOL/L (ref 24–32)
CREAT SERPL-MCNC: 0.61 MG/DL (ref 0.6–1.1)
EOSINOPHIL # BLD AUTO: 0.4 X10'3 (ref 0–0.9)
EOSINOPHIL NFR BLD AUTO: 5.9 % (ref 0–6)
ERYTHROCYTE [DISTWIDTH] IN BLOOD BY AUTOMATED COUNT: 14.1 % (ref 11.5–14.5)
GFR SERPL CREATININE-BSD FRML MDRD: > 90 ML/MIN
GLUCOSE SERPL-MCNC: 137 MG/DL (ref 70–104)
HCT VFR BLD AUTO: 37 % (ref 42–52)
HGB BLD-MCNC: 13 G/DL (ref 14–17.9)
LIPASE SERPL-CCNC: 179 U/L (ref 73–393)
LYMPHOCYTES # BLD AUTO: 1.7 X10'3 (ref 1.1–4.8)
LYMPHOCYTES NFR BLD AUTO: 24.6 % (ref 21–51)
MAGNESIUM SERPL-MCNC: 1.6 MG/DL (ref 1.5–2.4)
MCH RBC QN AUTO: 35.2 PG (ref 27–31)
MCHC RBC AUTO-ENTMCNC: 35.2 G/DL (ref 33–36.5)
MCV RBC AUTO: 100.1 FL (ref 78–98)
MONOCYTES # BLD AUTO: 1 X10'3 (ref 0–0.9)
MONOCYTES NFR BLD AUTO: 14.1 % (ref 2–12)
NEUTROPHILS # BLD AUTO: 3.7 X10'3 (ref 1.8–7.7)
NEUTROPHILS NFR BLD AUTO: 54.2 % (ref 42–75)
PHOSPHATE SERPL-MCNC: 3.5 MG/DL (ref 2.3–4.5)
PLATELET # BLD AUTO: 85 X10'3 (ref 140–440)
PMV BLD AUTO: 8.3 FL (ref 7.4–10.4)
POTASSIUM SERPL-SCNC: 3.5 MMOL/L (ref 3.5–5.1)
PROT SERPL-MCNC: 5.9 G/DL (ref 6.4–8.2)
RBC # BLD AUTO: 3.69 X10'6 (ref 4.7–6.1)
SODIUM SERPL-SCNC: 140 MMOL/L (ref 135–145)
WBC # BLD AUTO: 6.8 X10'3 (ref 4.5–11)

## 2020-06-28 RX ADMIN — SODIUM CHLORIDE SCH GM: 0.9 IRRIGANT IRRIGATION at 08:00

## 2020-06-28 RX ADMIN — INSULIN LISPRO SCH UNITS: 100 INJECTION, SOLUTION INTRAVENOUS; SUBCUTANEOUS at 08:50

## 2020-06-28 RX ADMIN — Medication SCH MMU: at 08:38

## 2020-06-28 RX ADMIN — RIFAXIMIN SCH MG: 550 TABLET ORAL at 08:36

## 2020-06-28 RX ADMIN — DOCUSATE SODIUM SCH MG: 100 CAPSULE, LIQUID FILLED ORAL at 08:44

## 2020-06-28 RX ADMIN — HEPARIN SODIUM SCH UNIT: 5000 INJECTION, SOLUTION INTRAVENOUS; SUBCUTANEOUS at 08:00

## 2020-06-28 RX ADMIN — THERA TABS SCH EACH: TAB at 08:36

## 2020-06-28 NOTE — NUR
CALLED INTO [facility] SCRIPT INTO [facility]UDY ON [address], CALLED EX-WIFE TO NOTIFY HER THAT 
SCRIPT IS AVAILABLE FOR

## 2020-06-28 NOTE — NUR
WAS NOT ABLE TO SCAN IVF'S, SCANNER ON CSR SPORATICALLY WILL NOT WORK. USED ADMINISTER 
BUTTON AND NEW BAG HANGING NOW

## 2020-06-28 NOTE — NUR
Patient in room ORTHO 4011B. I have received report from ROBIN Masters   and had the opportunity 
to ask questions and assume patient care.

## 2020-06-28 NOTE — NUR
DC INSTRUCTIONS GIVEN TO PT, PT HAD OPPORTUNITY TO ASK QUESTIONS. IV WAS REMOVED CANNULA 
INTACT, NO COMPLICATIONS.  PT WAS ASSISTED IN DRESSING, PERSONAL BELONGINGS WERE PACKED. 
PHARMACY MEDS WERE GIVEN BACK TO PT. PT WAS WHEELED DOWN TO PRIVATE VEHICLE IN STABLE 
CONDITION.

## 2020-07-07 ENCOUNTER — HOSPITAL ENCOUNTER (EMERGENCY)
Dept: HOSPITAL 94 - ER | Age: 60
Discharge: HOME | End: 2020-07-07
Payer: MEDICAID

## 2020-07-07 VITALS — HEIGHT: 68 IN | WEIGHT: 176.37 LBS | BODY MASS INDEX: 26.73 KG/M2

## 2020-07-07 VITALS — DIASTOLIC BLOOD PRESSURE: 82 MMHG | SYSTOLIC BLOOD PRESSURE: 141 MMHG

## 2020-07-07 DIAGNOSIS — Z56.0: ICD-10-CM

## 2020-07-07 DIAGNOSIS — Z60.2: ICD-10-CM

## 2020-07-07 DIAGNOSIS — Z72.89: ICD-10-CM

## 2020-07-07 DIAGNOSIS — M79.89: ICD-10-CM

## 2020-07-07 DIAGNOSIS — I87.8: Primary | ICD-10-CM

## 2020-07-07 DIAGNOSIS — Z79.899: ICD-10-CM

## 2020-07-07 DIAGNOSIS — Z79.2: ICD-10-CM

## 2020-07-07 DIAGNOSIS — Z59.0: ICD-10-CM

## 2020-07-07 DIAGNOSIS — E11.9: ICD-10-CM

## 2020-07-07 PROCEDURE — 93971 EXTREMITY STUDY: CPT

## 2020-07-07 PROCEDURE — 99284 EMERGENCY DEPT VISIT MOD MDM: CPT

## 2020-07-07 SDOH — ECONOMIC STABILITY - HOUSING INSECURITY: HOMELESSNESS: Z59.0

## 2020-07-07 SDOH — SOCIAL STABILITY - SOCIAL INSECURITY: PROBLEMS RELATED TO LIVING ALONE: Z60.2

## 2020-07-07 SDOH — ECONOMIC STABILITY - INCOME SECURITY: UNEMPLOYMENT, UNSPECIFIED: Z56.0

## 2020-08-15 ENCOUNTER — HOSPITAL ENCOUNTER (EMERGENCY)
Dept: HOSPITAL 94 - ER | Age: 60
Discharge: HOME | End: 2020-08-15
Payer: MEDICAID

## 2020-08-15 VITALS — HEIGHT: 68 IN | WEIGHT: 180.38 LBS | BODY MASS INDEX: 27.34 KG/M2

## 2020-08-15 VITALS — DIASTOLIC BLOOD PRESSURE: 78 MMHG | SYSTOLIC BLOOD PRESSURE: 143 MMHG
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
## 2020-08-15 DIAGNOSIS — S16.1XXA: Primary | ICD-10-CM
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
## 2020-08-15 DIAGNOSIS — Z56.0: ICD-10-CM

## 2020-08-15 DIAGNOSIS — Z79.899: ICD-10-CM

## 2020-08-15 DIAGNOSIS — Z60.2: ICD-10-CM

## 2020-08-15 DIAGNOSIS — Z59.0: ICD-10-CM

## 2020-08-15 DIAGNOSIS — M54.2: ICD-10-CM

## 2020-08-15 DIAGNOSIS — E11.9: ICD-10-CM
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
## 2020-08-15 DIAGNOSIS — Z72.89: ICD-10-CM

## 2020-08-15 PROCEDURE — 96372 THER/PROPH/DIAG INJ SC/IM: CPT

## 2020-08-15 PROCEDURE — 99283 EMERGENCY DEPT VISIT LOW MDM: CPT

## 2020-08-15 SDOH — ECONOMIC STABILITY - INCOME SECURITY: UNEMPLOYMENT, UNSPECIFIED: Z56.0

## 2020-08-15 SDOH — ECONOMIC STABILITY - HOUSING INSECURITY: HOMELESSNESS: Z59.0

## 2020-08-15 SDOH — SOCIAL STABILITY - SOCIAL INSECURITY: PROBLEMS RELATED TO LIVING ALONE: Z60.2

## 2020-09-02 ENCOUNTER — HOSPITAL ENCOUNTER (EMERGENCY)
Dept: HOSPITAL 94 - ER | Age: 60
Discharge: HOME | End: 2020-09-02
Payer: MEDICAID

## 2020-09-02 VITALS — WEIGHT: 181.37 LBS | BODY MASS INDEX: 27.49 KG/M2 | HEIGHT: 68 IN

## 2020-09-02 VITALS — SYSTOLIC BLOOD PRESSURE: 130 MMHG | DIASTOLIC BLOOD PRESSURE: 99 MMHG
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
## 2020-09-02 DIAGNOSIS — M54.2: ICD-10-CM
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
## 2020-09-02 DIAGNOSIS — S81.812A: Primary | ICD-10-CM

## 2020-09-02 PROCEDURE — 99284 EMERGENCY DEPT VISIT MOD MDM: CPT

## 2020-09-02 PROCEDURE — 90471 IMMUNIZATION ADMIN: CPT

## 2020-09-02 PROCEDURE — 90715 TDAP VACCINE 7 YRS/> IM: CPT

## 2020-09-02 PROCEDURE — 12004 RPR S/N/AX/GEN/TRK7.6-12.5CM: CPT

## 2020-09-05 ENCOUNTER — HOSPITAL ENCOUNTER (EMERGENCY)
Dept: HOSPITAL 94 - ER | Age: 60
Discharge: HOME | End: 2020-09-05
Payer: MEDICAID

## 2020-09-05 VITALS — DIASTOLIC BLOOD PRESSURE: 75 MMHG | SYSTOLIC BLOOD PRESSURE: 129 MMHG

## 2020-09-05 VITALS — BODY MASS INDEX: 22.05 KG/M2 | HEIGHT: 68 IN | WEIGHT: 145.51 LBS

## 2020-09-05 DIAGNOSIS — Z79.899: ICD-10-CM

## 2020-09-05 DIAGNOSIS — Z60.2: ICD-10-CM

## 2020-09-05 DIAGNOSIS — S81.812D: Primary | ICD-10-CM

## 2020-09-05 DIAGNOSIS — E87.6: ICD-10-CM

## 2020-09-05 DIAGNOSIS — E11.9: ICD-10-CM

## 2020-09-05 DIAGNOSIS — K74.60: ICD-10-CM
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
## 2020-09-05 DIAGNOSIS — Z79.2: ICD-10-CM

## 2020-09-05 DIAGNOSIS — Z59.0: ICD-10-CM

## 2020-09-05 DIAGNOSIS — Z56.0: ICD-10-CM

## 2020-09-05 LAB
ALBUMIN SERPL BCP-MCNC: 2.2 G/DL (ref 3.4–5)
ALBUMIN/GLOB SERPL: 0.5 {RATIO} (ref 1.1–1.5)
ALP SERPL-CCNC: 102 IU/L (ref 46–116)
ALT SERPL W P-5'-P-CCNC: 70 U/L (ref 12–78)
ANION GAP SERPL CALCULATED.3IONS-SCNC: 5 MMOL/L (ref 8–16)
AST SERPL W P-5'-P-CCNC: 100 U/L (ref 10–37)
BASOPHILS # BLD AUTO: 0.1 X10'3 (ref 0–0.2)
BASOPHILS NFR BLD AUTO: 2.4 % (ref 0–1)
BILIRUB SERPL-MCNC: 2 MG/DL (ref 0.1–1)
BUN SERPL-MCNC: 7 MG/DL (ref 7–18)
BUN/CREAT SERPL: 10.1 (ref 5.4–32)
CALCIUM SERPL-MCNC: 7.3 MG/DL (ref 8.5–10.1)
CHLORIDE SERPL-SCNC: 105 MMOL/L (ref 99–107)
CO2 SERPL-SCNC: 25.1 MMOL/L (ref 24–32)
CREAT SERPL-MCNC: 0.69 MG/DL (ref 0.6–1.1)
EOSINOPHIL # BLD AUTO: 0.4 X10'3 (ref 0–0.9)
EOSINOPHIL NFR BLD AUTO: 7.9 % (ref 0–6)
ERYTHROCYTE [DISTWIDTH] IN BLOOD BY AUTOMATED COUNT: 16.7 % (ref 11.5–14.5)
GFR SERPL CREATININE-BSD FRML MDRD: > 90 ML/MIN
GLUCOSE SERPL-MCNC: 183 MG/DL (ref 70–104)
HCT VFR BLD AUTO: 33.6 % (ref 42–52)
HGB BLD-MCNC: 11.5 G/DL (ref 14–17.9)
LYMPHOCYTES # BLD AUTO: 1.3 X10'3 (ref 1.1–4.8)
LYMPHOCYTES NFR BLD AUTO: 29 % (ref 21–51)
MAGNESIUM SERPL-MCNC: 1.9 MG/DL (ref 1.5–2.4)
MCH RBC QN AUTO: 35.8 PG (ref 27–31)
MCHC RBC AUTO-ENTMCNC: 34.3 G/DL (ref 33–36.5)
MCV RBC AUTO: 104.5 FL (ref 78–98)
MONOCYTES # BLD AUTO: 0.6 X10'3 (ref 0–0.9)
MONOCYTES NFR BLD AUTO: 13 % (ref 2–12)
NEUTROPHILS # BLD AUTO: 2.1 X10'3 (ref 1.8–7.7)
NEUTROPHILS NFR BLD AUTO: 47.7 % (ref 42–75)
PLATELET # BLD AUTO: 111 X10'3 (ref 140–440)
PMV BLD AUTO: 8.5 FL (ref 7.4–10.4)
POTASSIUM SERPL-SCNC: 2.8 MMOL/L (ref 3.5–5.1)
PROT SERPL-MCNC: 6.5 G/DL (ref 6.4–8.2)
RBC # BLD AUTO: 3.22 X10'6 (ref 4.7–6.1)
SODIUM SERPL-SCNC: 135 MMOL/L (ref 135–145)
WBC # BLD AUTO: 4.5 X10'3 (ref 4.5–11)

## 2020-09-05 PROCEDURE — 84145 PROCALCITONIN (PCT): CPT

## 2020-09-05 PROCEDURE — 83605 ASSAY OF LACTIC ACID: CPT

## 2020-09-05 PROCEDURE — 87040 BLOOD CULTURE FOR BACTERIA: CPT

## 2020-09-05 PROCEDURE — 36415 COLL VENOUS BLD VENIPUNCTURE: CPT

## 2020-09-05 PROCEDURE — 71045 X-RAY EXAM CHEST 1 VIEW: CPT

## 2020-09-05 PROCEDURE — 80053 COMPREHEN METABOLIC PANEL: CPT

## 2020-09-05 PROCEDURE — 85025 COMPLETE CBC W/AUTO DIFF WBC: CPT

## 2020-09-05 PROCEDURE — 83735 ASSAY OF MAGNESIUM: CPT

## 2020-09-05 PROCEDURE — 99284 EMERGENCY DEPT VISIT MOD MDM: CPT

## 2020-09-05 SDOH — ECONOMIC STABILITY - HOUSING INSECURITY: HOMELESSNESS: Z59.0

## 2020-09-05 SDOH — ECONOMIC STABILITY - INCOME SECURITY: UNEMPLOYMENT, UNSPECIFIED: Z56.0

## 2020-09-05 SDOH — SOCIAL STABILITY - SOCIAL INSECURITY: PROBLEMS RELATED TO LIVING ALONE: Z60.2

## 2020-09-05 NOTE — NUR
{null, 
No SpO2 monitoring charted on Pt during his stay due to faulty SpO2 sensor and 
no replacment able to be found. 
}

## 2020-09-06 ENCOUNTER — HOSPITAL ENCOUNTER (EMERGENCY)
Dept: HOSPITAL 94 - ER | Age: 60
Discharge: HOME | End: 2020-09-06
Payer: MEDICAID

## 2020-09-06 VITALS — DIASTOLIC BLOOD PRESSURE: 74 MMHG | SYSTOLIC BLOOD PRESSURE: 127 MMHG

## 2020-09-06 DIAGNOSIS — L08.9: ICD-10-CM

## 2020-09-06 DIAGNOSIS — F15.10: Primary | ICD-10-CM

## 2020-09-06 DIAGNOSIS — Z59.0: ICD-10-CM

## 2020-09-06 DIAGNOSIS — M79.604: ICD-10-CM

## 2020-09-06 DIAGNOSIS — E11.9: ICD-10-CM

## 2020-09-06 DIAGNOSIS — Z60.2: ICD-10-CM

## 2020-09-06 DIAGNOSIS — R60.9: ICD-10-CM

## 2020-09-06 DIAGNOSIS — Z79.01: ICD-10-CM

## 2020-09-06 DIAGNOSIS — Z79.899: ICD-10-CM

## 2020-09-06 DIAGNOSIS — Z56.0: ICD-10-CM

## 2020-09-06 DIAGNOSIS — G89.29: ICD-10-CM

## 2020-09-06 DIAGNOSIS — M79.605: ICD-10-CM

## 2020-09-06 LAB
ALBUMIN SERPL BCP-MCNC: 2.2 G/DL (ref 3.4–5)
ALBUMIN/GLOB SERPL: 0.5 {RATIO} (ref 1.1–1.5)
ALP SERPL-CCNC: 89 IU/L (ref 46–116)
ALT SERPL W P-5'-P-CCNC: 65 U/L (ref 12–78)
AMMONIA PLAS-SCNC: 87 UMOL/L (ref 11–32)
AMPHETAMINES UR QL SCN: POSITIVE
ANION GAP SERPL CALCULATED.3IONS-SCNC: 6 MMOL/L (ref 8–16)
AST SERPL W P-5'-P-CCNC: 93 U/L (ref 10–37)
BACTERIA URNS QL MICRO: (no result) /HPF
BARBITURATES UR QL SCN: NEGATIVE
BASOPHILS # BLD AUTO: 0.1 X10'3 (ref 0–0.2)
BASOPHILS NFR BLD AUTO: 1.1 % (ref 0–1)
BENZODIAZ UR QL SCN: NEGATIVE
BILIRUB SERPL-MCNC: 2.5 MG/DL (ref 0.1–1)
BUN SERPL-MCNC: 9 MG/DL (ref 7–18)
BUN/CREAT SERPL: 11.8 (ref 5.4–32)
BZE UR QL SCN: NEGATIVE
CALCIUM SERPL-MCNC: 7.7 MG/DL (ref 8.5–10.1)
CANNABINOIDS UR QL SCN: NEGATIVE
CHLORIDE SERPL-SCNC: 104 MMOL/L (ref 99–107)
CLARITY UR: CLEAR
CO2 SERPL-SCNC: 25.3 MMOL/L (ref 24–32)
COLOR UR: YELLOW
CREAT SERPL-MCNC: 0.76 MG/DL (ref 0.6–1.1)
DEPRECATED SQUAMOUS URNS QL MICRO: (no result) /LPF
EOSINOPHIL # BLD AUTO: 0.1 X10'3 (ref 0–0.9)
EOSINOPHIL NFR BLD AUTO: 1.6 % (ref 0–6)
ERYTHROCYTE [DISTWIDTH] IN BLOOD BY AUTOMATED COUNT: 16.4 % (ref 11.5–14.5)
ETHANOL SERPL-MCNC: < 0.01 GM/DL (ref 0–0.01)
GFR SERPL CREATININE-BSD FRML MDRD: > 90 ML/MIN
GLUCOSE SERPL-MCNC: 144 MG/DL (ref 70–104)
GLUCOSE UR STRIP-MCNC: NEGATIVE MG/DL
HCT VFR BLD AUTO: 32.1 % (ref 42–52)
HGB BLD-MCNC: 11.4 G/DL (ref 14–17.9)
HGB UR QL STRIP: (no result)
KETONES UR STRIP-MCNC: NEGATIVE MG/DL
LACTATE PLASV-SCNC: 1.6 MMOL/L (ref 0.4–2)
LEUKOCYTE ESTERASE UR QL STRIP: NEGATIVE
LYMPHOCYTES # BLD AUTO: 0.9 X10'3 (ref 1.1–4.8)
LYMPHOCYTES NFR BLD AUTO: 12.9 % (ref 21–51)
MAGNESIUM SERPL-MCNC: 1.8 MG/DL (ref 1.5–2.4)
MCH RBC QN AUTO: 36.7 PG (ref 27–31)
MCHC RBC AUTO-ENTMCNC: 35.5 G/DL (ref 33–36.5)
MCV RBC AUTO: 103.2 FL (ref 78–98)
METHADONE UR QL SCN: NEGATIVE
MONOCYTES # BLD AUTO: 0.9 X10'3 (ref 0–0.9)
MONOCYTES NFR BLD AUTO: 11.7 % (ref 2–12)
MUCOUS THREADS URNS QL MICRO: (no result) /LPF
NEUTROPHILS # BLD AUTO: 5.3 X10'3 (ref 1.8–7.7)
NEUTROPHILS NFR BLD AUTO: 72.7 % (ref 42–75)
NITRITE UR QL STRIP: NEGATIVE
OPIATES UR QL SCN: NEGATIVE
PCP UR QL SCN: NEGATIVE
PH UR STRIP: 6 [PH] (ref 4.8–8)
PLATELET # BLD AUTO: 119 X10'3 (ref 140–440)
PMV BLD AUTO: 8.2 FL (ref 7.4–10.4)
POTASSIUM SERPL-SCNC: 3.2 MMOL/L (ref 3.5–5.1)
PROT SERPL-MCNC: 6.6 G/DL (ref 6.4–8.2)
PROT UR QL STRIP: NEGATIVE MG/DL
RBC # BLD AUTO: 3.11 X10'6 (ref 4.7–6.1)
RBC #/AREA URNS HPF: (no result) /HPF (ref 0–2)
SODIUM SERPL-SCNC: 135 MMOL/L (ref 135–145)
SP GR UR STRIP: 1.02 (ref 1–1.03)
URN COLLECT METHOD CLASS: (no result)
UROBILINOGEN UR STRIP-MCNC: 1 E.U/DL (ref 0.2–1)
WBC # BLD AUTO: 7.3 X10'3 (ref 4.5–11)
WBC #/AREA URNS HPF: (no result) /HPF (ref 0–4)

## 2020-09-06 PROCEDURE — 99285 EMERGENCY DEPT VISIT HI MDM: CPT

## 2020-09-06 PROCEDURE — 83735 ASSAY OF MAGNESIUM: CPT

## 2020-09-06 PROCEDURE — 82140 ASSAY OF AMMONIA: CPT

## 2020-09-06 PROCEDURE — 84145 PROCALCITONIN (PCT): CPT

## 2020-09-06 PROCEDURE — 80305 DRUG TEST PRSMV DIR OPT OBS: CPT

## 2020-09-06 PROCEDURE — 93005 ELECTROCARDIOGRAM TRACING: CPT

## 2020-09-06 PROCEDURE — 71045 X-RAY EXAM CHEST 1 VIEW: CPT

## 2020-09-06 PROCEDURE — 85025 COMPLETE CBC W/AUTO DIFF WBC: CPT

## 2020-09-06 PROCEDURE — 80053 COMPREHEN METABOLIC PANEL: CPT

## 2020-09-06 PROCEDURE — 83880 ASSAY OF NATRIURETIC PEPTIDE: CPT

## 2020-09-06 PROCEDURE — 81001 URINALYSIS AUTO W/SCOPE: CPT

## 2020-09-06 PROCEDURE — 83605 ASSAY OF LACTIC ACID: CPT

## 2020-09-06 PROCEDURE — 80320 DRUG SCREEN QUANTALCOHOLS: CPT

## 2020-09-06 PROCEDURE — 96360 HYDRATION IV INFUSION INIT: CPT

## 2020-09-06 PROCEDURE — 36415 COLL VENOUS BLD VENIPUNCTURE: CPT

## 2020-09-06 PROCEDURE — 87040 BLOOD CULTURE FOR BACTERIA: CPT

## 2020-09-06 SDOH — SOCIAL STABILITY - SOCIAL INSECURITY: PROBLEMS RELATED TO LIVING ALONE: Z60.2

## 2020-09-06 SDOH — ECONOMIC STABILITY - HOUSING INSECURITY: HOMELESSNESS: Z59.0

## 2020-09-06 SDOH — ECONOMIC STABILITY - INCOME SECURITY: UNEMPLOYMENT, UNSPECIFIED: Z56.0

## 2020-09-06 NOTE — NUR
PATIENT'S MENTATION HAS IMPROVED, HE IS NOW AOX4, AND HE WANTS TO GO HOME. 
PATIENT GIVEN LACTULOSE AND WILL BE GAIT TESTED BEFORE DISCHARGE.

## 2020-10-15 ENCOUNTER — HOSPITAL ENCOUNTER (INPATIENT)
Dept: HOSPITAL 94 - ER | Age: 60
Discharge: LEFT BEFORE DISCHARGE | DRG: 720 | End: 2020-10-15
Attending: FAMILY MEDICINE | Admitting: FAMILY MEDICINE
Payer: MEDICAID

## 2020-10-15 VITALS — DIASTOLIC BLOOD PRESSURE: 66 MMHG | SYSTOLIC BLOOD PRESSURE: 135 MMHG

## 2020-10-15 VITALS — BODY MASS INDEX: 26.7 KG/M2 | HEIGHT: 68 IN | WEIGHT: 176.15 LBS

## 2020-10-15 DIAGNOSIS — G92: ICD-10-CM

## 2020-10-15 DIAGNOSIS — A41.9: Primary | ICD-10-CM

## 2020-10-15 DIAGNOSIS — L03.90: ICD-10-CM

## 2020-10-15 LAB
ALBUMIN SERPL BCP-MCNC: 2.4 G/DL (ref 3.4–5)
ALBUMIN/GLOB SERPL: 0.5 {RATIO} (ref 1.1–1.5)
ALP SERPL-CCNC: 113 IU/L (ref 46–116)
ALT SERPL W P-5'-P-CCNC: 69 U/L (ref 12–78)
ANION GAP SERPL CALCULATED.3IONS-SCNC: 13 MMOL/L (ref 8–16)
AST SERPL W P-5'-P-CCNC: 83 U/L (ref 10–37)
BASOPHILS # BLD AUTO: 0.1 X10'3 (ref 0–0.2)
BASOPHILS NFR BLD AUTO: 0.5 % (ref 0–1)
BILIRUB SERPL-MCNC: 4.3 MG/DL (ref 0.1–1)
BUN SERPL-MCNC: 14 MG/DL (ref 7–18)
BUN/CREAT SERPL: 11.8 (ref 5.4–32)
CALCIUM SERPL-MCNC: 7.5 MG/DL (ref 8.5–10.1)
CHLORIDE SERPL-SCNC: 103 MMOL/L (ref 99–107)
CO2 SERPL-SCNC: 18.8 MMOL/L (ref 24–32)
CREAT SERPL-MCNC: 1.19 MG/DL (ref 0.6–1.1)
EOSINOPHIL # BLD AUTO: 0 X10'3 (ref 0–0.9)
EOSINOPHIL NFR BLD AUTO: 0.2 % (ref 0–6)
ERYTHROCYTE [DISTWIDTH] IN BLOOD BY AUTOMATED COUNT: 14.4 % (ref 11.5–14.5)
ETHANOL SERPL-MCNC: < 0.01 GM/DL (ref 0–0.01)
GFR SERPL CREATININE-BSD FRML MDRD: 62 ML/MIN
GLUCOSE SERPL-MCNC: 111 MG/DL (ref 70–104)
HBA1C MFR BLD: 6.5 % (ref 4.5–6.2)
HCT VFR BLD AUTO: 37.7 % (ref 42–52)
HGB BLD-MCNC: 12.9 G/DL (ref 14–17.9)
LYMPHOCYTES # BLD AUTO: 1.6 X10'3 (ref 1.1–4.8)
LYMPHOCYTES NFR BLD AUTO: 10.6 % (ref 21–51)
MCH RBC QN AUTO: 34.8 PG (ref 27–31)
MCHC RBC AUTO-ENTMCNC: 34.2 G/DL (ref 33–36.5)
MCV RBC AUTO: 101.5 FL (ref 78–98)
MONOCYTES # BLD AUTO: 0.8 X10'3 (ref 0–0.9)
MONOCYTES NFR BLD AUTO: 5.5 % (ref 2–12)
NEUTROPHILS # BLD AUTO: 12.8 X10'3 (ref 1.8–7.7)
NEUTROPHILS NFR BLD AUTO: 83.2 % (ref 42–75)
PLATELET # BLD AUTO: 133 X10'3 (ref 140–440)
PMV BLD AUTO: 8.6 FL (ref 7.4–10.4)
POTASSIUM SERPL-SCNC: 3.5 MMOL/L (ref 3.5–5.1)
PROT SERPL-MCNC: 6.8 G/DL (ref 6.4–8.2)
RBC # BLD AUTO: 3.71 X10'6 (ref 4.7–6.1)
SODIUM SERPL-SCNC: 135 MMOL/L (ref 135–145)
TROPONIN I SERPL-MCNC: < 0.04 NG/ML (ref 0–0.05)
WBC # BLD AUTO: 15.4 X10'3 (ref 4.5–11)

## 2020-10-15 PROCEDURE — 71045 X-RAY EXAM CHEST 1 VIEW: CPT

## 2020-10-15 PROCEDURE — 99285 EMERGENCY DEPT VISIT HI MDM: CPT

## 2020-10-15 PROCEDURE — 93970 EXTREMITY STUDY: CPT

## 2020-10-15 PROCEDURE — 87040 BLOOD CULTURE FOR BACTERIA: CPT

## 2020-10-15 PROCEDURE — 80320 DRUG SCREEN QUANTALCOHOLS: CPT

## 2020-10-15 PROCEDURE — 70450 CT HEAD/BRAIN W/O DYE: CPT

## 2020-10-15 PROCEDURE — 83036 HEMOGLOBIN GLYCOSYLATED A1C: CPT

## 2020-10-15 PROCEDURE — 84484 ASSAY OF TROPONIN QUANT: CPT

## 2020-10-15 PROCEDURE — 80053 COMPREHEN METABOLIC PANEL: CPT

## 2020-10-15 PROCEDURE — 83605 ASSAY OF LACTIC ACID: CPT

## 2020-10-15 PROCEDURE — 93005 ELECTROCARDIOGRAM TRACING: CPT

## 2020-10-15 PROCEDURE — 96365 THER/PROPH/DIAG IV INF INIT: CPT

## 2020-10-15 PROCEDURE — 36415 COLL VENOUS BLD VENIPUNCTURE: CPT

## 2020-10-15 PROCEDURE — 84145 PROCALCITONIN (PCT): CPT

## 2020-10-15 PROCEDURE — 76937 US GUIDE VASCULAR ACCESS: CPT

## 2020-10-15 PROCEDURE — 85025 COMPLETE CBC W/AUTO DIFF WBC: CPT

## 2020-10-15 NOTE — NUR
pt ripped out IV and put his clothes and shoes on stating he is leaving and no longer needs 
to be here. educated pt on why he is in the hospital and the current plan of care and 
advised that he should not leave. pt is still stating he wants to leave AMA

## 2020-10-15 NOTE — NUR
pt is refusing certain treatment and medications stating "I am going home I am not staying 
here I knew you guys were up to something". educated pt on importance of staying along with 
explaining his plan of care and why everything he has ordered is specialized just for his 
care and to help him get better. pt still refuses certain things, will continue to monitor 
pt

## 2020-10-15 NOTE — NUR
pt arrived to PCU unit from ER, pt is alert and oriented and on RA, pt vanco is still 
infusing, will continue to monitor pt

## 2020-10-15 NOTE — NUR
PT STATES "IM NOT STAYING", PT REFUSING TO VOID AND REFUSING F/C. PROVIDER 
ADVISED. DR SÁNCHEZ IN TO SEE PT AND HE TOLD HIM HE WAS REFUSING F/C AND TO STAY.

## 2020-10-15 NOTE — NUR
Page Sent

 



promotional table spacer

PAGER ID: [phone]

MESSAGE: Antoine Ferrara 59M admitted with sepsis, Bilateral LE cellulitis, pt is leaving AMA 
thank you Becki TENA [phone]

## 2021-06-30 ENCOUNTER — HOSPITAL ENCOUNTER (EMERGENCY)
Dept: HOSPITAL 94 - ER | Age: 61
Discharge: HOME | End: 2021-06-30
Payer: MEDICAID

## 2021-06-30 VITALS — WEIGHT: 182.32 LBS | BODY MASS INDEX: 24.69 KG/M2 | HEIGHT: 72 IN

## 2021-06-30 VITALS — DIASTOLIC BLOOD PRESSURE: 78 MMHG | SYSTOLIC BLOOD PRESSURE: 153 MMHG

## 2021-06-30 DIAGNOSIS — F17.200: ICD-10-CM

## 2021-06-30 DIAGNOSIS — R60.9: Primary | ICD-10-CM

## 2021-06-30 DIAGNOSIS — Z60.2: ICD-10-CM

## 2021-06-30 DIAGNOSIS — Z79.899: ICD-10-CM

## 2021-06-30 DIAGNOSIS — G89.29: ICD-10-CM

## 2021-06-30 DIAGNOSIS — Z56.0: ICD-10-CM

## 2021-06-30 DIAGNOSIS — Z79.2: ICD-10-CM

## 2021-06-30 DIAGNOSIS — E11.9: ICD-10-CM

## 2021-06-30 DIAGNOSIS — Z59.0: ICD-10-CM

## 2021-06-30 LAB
ALBUMIN SERPL BCP-MCNC: 2.2 G/DL (ref 3.4–5)
ALBUMIN/GLOB SERPL: 0.6 {RATIO} (ref 1.1–1.5)
ALP SERPL-CCNC: 90 IU/L (ref 46–116)
ALT SERPL W P-5'-P-CCNC: 64 U/L (ref 12–78)
ANION GAP SERPL CALCULATED.3IONS-SCNC: 6 MMOL/L (ref 8–16)
ANISOCYTOSIS BLD QL SMEAR: (no result)
AST SERPL W P-5'-P-CCNC: 72 U/L (ref 10–37)
BASOPHILS # BLD AUTO: 0.1 X10'3 (ref 0–0.2)
BASOPHILS NFR BLD AUTO: 1.8 % (ref 0–1)
BASOPHILS NFR BLD MANUAL: 2 % (ref 0–1)
BILIRUB SERPL-MCNC: 2.3 MG/DL (ref 0.1–1)
BUN SERPL-MCNC: 10 MG/DL (ref 7–18)
BUN/CREAT SERPL: 13.3 (ref 5.4–32)
CALCIUM SERPL-MCNC: 8 MG/DL (ref 8.5–10.1)
CHLORIDE SERPL-SCNC: 106 MMOL/L (ref 99–107)
CO2 SERPL-SCNC: 27.2 MMOL/L (ref 24–32)
CREAT SERPL-MCNC: 0.75 MG/DL (ref 0.6–1.1)
EOSINOPHIL # BLD AUTO: 0.5 X10'3 (ref 0–0.9)
EOSINOPHIL NFR BLD AUTO: 9.1 % (ref 0–6)
EOSINOPHIL NFR BLD MANUAL: 10 % (ref 0–6)
ERYTHROCYTE [DISTWIDTH] IN BLOOD BY AUTOMATED COUNT: 17.1 % (ref 11.5–14.5)
GFR SERPL CREATININE-BSD FRML MDRD: > 90 ML/MIN
GLUCOSE SERPL-MCNC: 122 MG/DL (ref 70–104)
HCT VFR BLD AUTO: 33.1 % (ref 42–52)
HGB BLD-MCNC: 11.7 G/DL (ref 14–17.9)
LG PLATELETS BLD QL SMEAR: (no result)
LYMPHOCYTES # BLD AUTO: 2 X10'3 (ref 1.1–4.8)
LYMPHOCYTES NFR BLD AUTO: 36.8 % (ref 21–51)
LYMPHOCYTES NFR BLD MANUAL: 26 % (ref 21–51)
MCH RBC QN AUTO: 34.6 PG (ref 27–31)
MCHC RBC AUTO-ENTMCNC: 35.3 G/DL (ref 33–36.5)
MCV RBC AUTO: 98 FL (ref 78–98)
MONOCYTES # BLD AUTO: 0.8 X10'3 (ref 0–0.9)
MONOCYTES NFR BLD AUTO: 15.1 % (ref 2–12)
MONOCYTES NFR BLD MANUAL: 12 % (ref 2–12)
NEUTROPHILS # BLD AUTO: 2 X10'3 (ref 1.8–7.7)
NEUTROPHILS NFR BLD AUTO: 37.2 % (ref 42–75)
NEUTS BAND # BLD MANUAL: 1 % (ref 0–10)
NEUTS SEG NFR BLD MANUAL: 49 % (ref 42–75)
OVALOCYTES BLD QL SMEAR: (no result)
PLATELET # BLD AUTO: 124 X10'3 (ref 140–440)
PLATELET BLD QL SMEAR: (no result)
PMV BLD AUTO: 8.1 FL (ref 7.4–10.4)
POIKILOCYTOSIS BLD QL SMEAR: (no result)
POTASSIUM SERPL-SCNC: 3.8 MMOL/L (ref 3.5–5.1)
PROT SERPL-MCNC: 6 G/DL (ref 6.4–8.2)
RBC # BLD AUTO: 3.38 X10'6 (ref 4.7–6.1)
RBC MORPH BLD: (no result)
SODIUM SERPL-SCNC: 139 MMOL/L (ref 135–145)
TOTAL CELLS COUNTED FLD: 100
WBC # BLD AUTO: 5.5 X10'3 (ref 4.5–11)

## 2021-06-30 PROCEDURE — 85007 BL SMEAR W/DIFF WBC COUNT: CPT

## 2021-06-30 PROCEDURE — 82948 REAGENT STRIP/BLOOD GLUCOSE: CPT

## 2021-06-30 PROCEDURE — 80053 COMPREHEN METABOLIC PANEL: CPT

## 2021-06-30 PROCEDURE — 99285 EMERGENCY DEPT VISIT HI MDM: CPT

## 2021-06-30 PROCEDURE — 71045 X-RAY EXAM CHEST 1 VIEW: CPT

## 2021-06-30 PROCEDURE — 85025 COMPLETE CBC W/AUTO DIFF WBC: CPT

## 2021-06-30 PROCEDURE — 93005 ELECTROCARDIOGRAM TRACING: CPT

## 2021-06-30 PROCEDURE — 84484 ASSAY OF TROPONIN QUANT: CPT

## 2021-06-30 PROCEDURE — 83880 ASSAY OF NATRIURETIC PEPTIDE: CPT

## 2021-06-30 PROCEDURE — 36415 COLL VENOUS BLD VENIPUNCTURE: CPT

## 2021-06-30 SDOH — SOCIAL STABILITY - SOCIAL INSECURITY: PROBLEMS RELATED TO LIVING ALONE: Z60.2

## 2021-06-30 SDOH — ECONOMIC STABILITY - INCOME SECURITY: UNEMPLOYMENT, UNSPECIFIED: Z56.0

## 2021-06-30 SDOH — ECONOMIC STABILITY - HOUSING INSECURITY: HOMELESSNESS: Z59.0

## 2021-06-30 NOTE — NUR
PT UP TO VBR AMBULATING W STEADY SLOW GAIT. PT TO RECEIVE IV LASIX AND PO RX 
THEN CAN BE DISCHARGED. PT REPORTS HE WILL NEED ASSISTANCE TO GET HOME. HAS 
NOONE TO TAKE HIM HOME.

## 2021-06-30 NOTE — NUR
UNABLE TO PLACE IV AFTER 5 ATTEMPTS. DR. JAMIL UPDATED OF THIS AND OK TO HAVE 
PT TAKE PO LASIX 100 MG PRIOR TO DC.  PT IS AGREEABLE TO THIS PLAN. PT WITH 
FRIEND AT BEDSIDE.

## 2021-07-20 ENCOUNTER — HOSPITAL ENCOUNTER (INPATIENT)
Dept: HOSPITAL 94 - ER | Age: 61
LOS: 7 days | Discharge: LEFT BEFORE DISCHARGE | DRG: 720 | End: 2021-07-27
Attending: FAMILY MEDICINE | Admitting: INTERNAL MEDICINE
Payer: MEDICAID

## 2021-07-20 VITALS — WEIGHT: 198.42 LBS | BODY MASS INDEX: 31.14 KG/M2 | HEIGHT: 67 IN

## 2021-07-20 DIAGNOSIS — K74.60: ICD-10-CM

## 2021-07-20 DIAGNOSIS — A41.59: Primary | ICD-10-CM

## 2021-07-20 DIAGNOSIS — R18.8: ICD-10-CM

## 2021-07-20 DIAGNOSIS — Z60.2: ICD-10-CM

## 2021-07-20 DIAGNOSIS — G93.41: ICD-10-CM

## 2021-07-20 DIAGNOSIS — F15.10: ICD-10-CM

## 2021-07-20 DIAGNOSIS — E11.9: ICD-10-CM

## 2021-07-20 DIAGNOSIS — E87.6: ICD-10-CM

## 2021-07-20 DIAGNOSIS — Z20.822: ICD-10-CM

## 2021-07-20 DIAGNOSIS — J18.9: ICD-10-CM

## 2021-07-20 DIAGNOSIS — F17.200: ICD-10-CM

## 2021-07-20 DIAGNOSIS — K72.90: ICD-10-CM

## 2021-07-20 DIAGNOSIS — N39.0: ICD-10-CM

## 2021-07-20 DIAGNOSIS — Z53.29: ICD-10-CM

## 2021-07-20 DIAGNOSIS — G89.29: ICD-10-CM

## 2021-07-20 DIAGNOSIS — Z59.0: ICD-10-CM

## 2021-07-20 DIAGNOSIS — D61.818: ICD-10-CM

## 2021-07-20 DIAGNOSIS — K52.9: ICD-10-CM

## 2021-07-20 DIAGNOSIS — Z91.19: ICD-10-CM

## 2021-07-20 LAB
ALBUMIN SERPL BCP-MCNC: 1.7 G/DL (ref 3.4–5)
ALBUMIN/GLOB SERPL: 0.4 {RATIO} (ref 1.1–1.5)
ALP SERPL-CCNC: 125 IU/L (ref 46–116)
ALT SERPL W P-5'-P-CCNC: 126 U/L (ref 12–78)
AMPHETAMINES UR QL SCN: POSITIVE
ANION GAP SERPL CALCULATED.3IONS-SCNC: 10 MMOL/L (ref 8–16)
ANISOCYTOSIS BLD QL SMEAR: (no result)
AST SERPL W P-5'-P-CCNC: 366 U/L (ref 10–37)
BACTERIA URNS QL MICRO: (no result) /HPF
BARBITURATES UR QL SCN: NEGATIVE
BASOPHILS # BLD AUTO: 0 X10'3 (ref 0–0.2)
BASOPHILS NFR BLD AUTO: 0.3 % (ref 0–1)
BENZODIAZ UR QL SCN: NEGATIVE
BILIRUB SERPL-MCNC: 2.3 MG/DL (ref 0.1–1)
BUN SERPL-MCNC: 21 MG/DL (ref 7–18)
BUN/CREAT SERPL: 19.6 (ref 5.4–32)
BZE UR QL SCN: NEGATIVE
CALCIUM SERPL-MCNC: 7.3 MG/DL (ref 8.5–10.1)
CANNABINOIDS UR QL SCN: NEGATIVE
CHLORIDE SERPL-SCNC: 108 MMOL/L (ref 99–107)
CLARITY UR: (no result)
CO2 SERPL-SCNC: 21.6 MMOL/L (ref 24–32)
COLOR UR: YELLOW
CREAT SERPL-MCNC: 1.07 MG/DL (ref 0.6–1.1)
DEPRECATED SQUAMOUS URNS QL MICRO: (no result) /LPF
EOSINOPHIL # BLD AUTO: 0 X10'3 (ref 0–0.9)
EOSINOPHIL NFR BLD AUTO: 0.1 % (ref 0–6)
ERYTHROCYTE [DISTWIDTH] IN BLOOD BY AUTOMATED COUNT: 18.8 % (ref 11.5–14.5)
ETHANOL SERPL-MCNC: < 0.01 GM/DL (ref 0–0.01)
GFR SERPL CREATININE-BSD FRML MDRD: 70 ML/MIN
GLUCOSE SERPL-MCNC: 166 MG/DL (ref 70–104)
GLUCOSE UR STRIP-MCNC: NEGATIVE MG/DL
HBA1C MFR BLD: 8.3 % (ref 4.5–6.2)
HCT VFR BLD AUTO: 32.4 % (ref 42–52)
HGB BLD-MCNC: 11.2 G/DL (ref 14–17.9)
HGB UR QL STRIP: (no result)
KETONES UR STRIP-MCNC: NEGATIVE MG/DL
LEUKOCYTE ESTERASE UR QL STRIP: NEGATIVE
LYMPHOCYTES # BLD AUTO: 1.1 X10'3 (ref 1.1–4.8)
LYMPHOCYTES NFR BLD AUTO: 6.6 % (ref 21–51)
MACROCYTES BLD QL SMEAR: (no result)
MAGNESIUM SERPL-MCNC: 2 MG/DL (ref 1.5–2.4)
MCH RBC QN AUTO: 34.8 PG (ref 27–31)
MCHC RBC AUTO-ENTMCNC: 34.5 G/DL (ref 33–36.5)
MCV RBC AUTO: 101.1 FL (ref 78–98)
METHADONE UR QL SCN: NEGATIVE
MONOCYTES # BLD AUTO: 1.8 X10'3 (ref 0–0.9)
MONOCYTES NFR BLD AUTO: 11.3 % (ref 2–12)
MUCOUS THREADS URNS QL MICRO: (no result) /LPF
NEUTROPHILS # BLD AUTO: 13.3 X10'3 (ref 1.8–7.7)
NEUTROPHILS NFR BLD AUTO: 81.7 % (ref 42–75)
NITRITE UR QL STRIP: NEGATIVE
OPIATES UR QL SCN: NEGATIVE
PCP UR QL SCN: NEGATIVE
PH UR STRIP: 5.5 [PH] (ref 4.8–8)
PLATELET # BLD AUTO: 125 X10'3 (ref 140–440)
PLATELET BLD QL SMEAR: NORMAL
PMV BLD AUTO: 6.6 FL (ref 7.4–10.4)
POTASSIUM SERPL-SCNC: 3.9 MMOL/L (ref 3.5–5.1)
PROT SERPL-MCNC: 5.6 G/DL (ref 6.4–8.2)
PROT UR QL STRIP: 100 MG/DL
RBC # BLD AUTO: 3.2 X10'6 (ref 4.7–6.1)
RBC #/AREA URNS HPF: (no result) /HPF (ref 0–2)
RBC MORPH BLD: (no result)
SCHISTOCYTES BLD QL SMEAR: (no result)
SODIUM SERPL-SCNC: 140 MMOL/L (ref 135–145)
SP GR UR STRIP: >=1.03 (ref 1–1.03)
TRANS CELLS URNS QL MICRO: (no result) /HPF
URN COLLECT METHOD CLASS: (no result)
UROBILINOGEN UR STRIP-MCNC: 1 E.U/DL (ref 0.2–1)
WBC # BLD AUTO: 16.2 X10'3 (ref 4.5–11)
WBC #/AREA URNS HPF: (no result) /HPF (ref 0–4)

## 2021-07-20 PROCEDURE — 96361 HYDRATE IV INFUSION ADD-ON: CPT

## 2021-07-20 PROCEDURE — 99285 EMERGENCY DEPT VISIT HI MDM: CPT

## 2021-07-20 PROCEDURE — 96365 THER/PROPH/DIAG IV INF INIT: CPT

## 2021-07-20 PROCEDURE — 85007 BL SMEAR W/DIFF WBC COUNT: CPT

## 2021-07-20 PROCEDURE — 82948 REAGENT STRIP/BLOOD GLUCOSE: CPT

## 2021-07-20 PROCEDURE — 87040 BLOOD CULTURE FOR BACTERIA: CPT

## 2021-07-20 PROCEDURE — 83036 HEMOGLOBIN GLYCOSYLATED A1C: CPT

## 2021-07-20 PROCEDURE — 86901 BLOOD TYPING SEROLOGIC RH(D): CPT

## 2021-07-20 PROCEDURE — 82140 ASSAY OF AMMONIA: CPT

## 2021-07-20 PROCEDURE — 84145 PROCALCITONIN (PCT): CPT

## 2021-07-20 PROCEDURE — 81001 URINALYSIS AUTO W/SCOPE: CPT

## 2021-07-20 PROCEDURE — 97110 THERAPEUTIC EXERCISES: CPT

## 2021-07-20 PROCEDURE — 80320 DRUG SCREEN QUANTALCOHOLS: CPT

## 2021-07-20 PROCEDURE — 97535 SELF CARE MNGMENT TRAINING: CPT

## 2021-07-20 PROCEDURE — 74176 CT ABD & PELVIS W/O CONTRAST: CPT

## 2021-07-20 PROCEDURE — 70450 CT HEAD/BRAIN W/O DYE: CPT

## 2021-07-20 PROCEDURE — 86885 COOMBS TEST INDIRECT QUAL: CPT

## 2021-07-20 PROCEDURE — 92508 TX SP LANG VOICE COMM GROUP: CPT

## 2021-07-20 PROCEDURE — 97162 PT EVAL MOD COMPLEX 30 MIN: CPT

## 2021-07-20 PROCEDURE — 83880 ASSAY OF NATRIURETIC PEPTIDE: CPT

## 2021-07-20 PROCEDURE — 85008 BL SMEAR W/O DIFF WBC COUNT: CPT

## 2021-07-20 PROCEDURE — 94760 N-INVAS EAR/PLS OXIMETRY 1: CPT

## 2021-07-20 PROCEDURE — 36415 COLL VENOUS BLD VENIPUNCTURE: CPT

## 2021-07-20 PROCEDURE — 80053 COMPREHEN METABOLIC PANEL: CPT

## 2021-07-20 PROCEDURE — 86900 BLOOD TYPING SEROLOGIC ABO: CPT

## 2021-07-20 PROCEDURE — 87635 SARS-COV-2 COVID-19 AMP PRB: CPT

## 2021-07-20 PROCEDURE — 85025 COMPLETE CBC W/AUTO DIFF WBC: CPT

## 2021-07-20 PROCEDURE — 97530 THERAPEUTIC ACTIVITIES: CPT

## 2021-07-20 PROCEDURE — 80305 DRUG TEST PRSMV DIR OPT OBS: CPT

## 2021-07-20 PROCEDURE — 71045 X-RAY EXAM CHEST 1 VIEW: CPT

## 2021-07-20 PROCEDURE — 97116 GAIT TRAINING THERAPY: CPT

## 2021-07-20 PROCEDURE — 93005 ELECTROCARDIOGRAM TRACING: CPT

## 2021-07-20 PROCEDURE — 87077 CULTURE AEROBIC IDENTIFY: CPT

## 2021-07-20 PROCEDURE — 93306 TTE W/DOPPLER COMPLETE: CPT

## 2021-07-20 PROCEDURE — 94640 AIRWAY INHALATION TREATMENT: CPT

## 2021-07-20 PROCEDURE — 84484 ASSAY OF TROPONIN QUANT: CPT

## 2021-07-20 PROCEDURE — 83605 ASSAY OF LACTIC ACID: CPT

## 2021-07-20 PROCEDURE — 96366 THER/PROPH/DIAG IV INF ADDON: CPT

## 2021-07-20 PROCEDURE — 92616 FEES W/LARYNGEAL SENSE TEST: CPT

## 2021-07-20 PROCEDURE — 87186 SC STD MICRODIL/AGAR DIL: CPT

## 2021-07-20 PROCEDURE — 87081 CULTURE SCREEN ONLY: CPT

## 2021-07-20 PROCEDURE — 76705 ECHO EXAM OF ABDOMEN: CPT

## 2021-07-20 PROCEDURE — 83735 ASSAY OF MAGNESIUM: CPT

## 2021-07-20 RX ADMIN — SODIUM CHLORIDE SCH MLS/HR: 9 INJECTION INTRAMUSCULAR; INTRAVENOUS; SUBCUTANEOUS at 23:49

## 2021-07-20 SDOH — ECONOMIC STABILITY - HOUSING INSECURITY: HOMELESSNESS: Z59.0

## 2021-07-20 SDOH — SOCIAL STABILITY - SOCIAL INSECURITY: PROBLEMS RELATED TO LIVING ALONE: Z60.2

## 2021-07-20 NOTE — NUR
Notified MD Pt unable to follow commands, altered. Pt stated he hit his head 
when he fell and complains of pain in his head during physical assessment.

## 2021-07-21 VITALS — SYSTOLIC BLOOD PRESSURE: 144 MMHG | DIASTOLIC BLOOD PRESSURE: 75 MMHG

## 2021-07-21 VITALS — DIASTOLIC BLOOD PRESSURE: 83 MMHG | SYSTOLIC BLOOD PRESSURE: 160 MMHG

## 2021-07-21 VITALS — DIASTOLIC BLOOD PRESSURE: 78 MMHG | SYSTOLIC BLOOD PRESSURE: 134 MMHG

## 2021-07-21 VITALS — SYSTOLIC BLOOD PRESSURE: 162 MMHG | DIASTOLIC BLOOD PRESSURE: 98 MMHG

## 2021-07-21 VITALS — SYSTOLIC BLOOD PRESSURE: 108 MMHG | DIASTOLIC BLOOD PRESSURE: 66 MMHG

## 2021-07-21 LAB
ALBUMIN SERPL BCP-MCNC: 1.8 G/DL (ref 3.4–5)
ALBUMIN/GLOB SERPL: 0.5 {RATIO} (ref 1.1–1.5)
ALP SERPL-CCNC: 128 IU/L (ref 46–116)
ALT SERPL W P-5'-P-CCNC: 148 U/L (ref 12–78)
ANION GAP SERPL CALCULATED.3IONS-SCNC: 11 MMOL/L (ref 8–16)
AST SERPL W P-5'-P-CCNC: 409 U/L (ref 10–37)
BASOPHILS # BLD AUTO: 0 X10'3 (ref 0–0.2)
BASOPHILS NFR BLD AUTO: 0.3 % (ref 0–1)
BILIRUB SERPL-MCNC: 2.3 MG/DL (ref 0.1–1)
BUN SERPL-MCNC: 24 MG/DL (ref 7–18)
BUN/CREAT SERPL: 24.5 (ref 5.4–32)
CALCIUM SERPL-MCNC: 7 MG/DL (ref 8.5–10.1)
CHLORIDE SERPL-SCNC: 107 MMOL/L (ref 99–107)
CO2 SERPL-SCNC: 22.5 MMOL/L (ref 24–32)
CREAT SERPL-MCNC: 0.98 MG/DL (ref 0.6–1.1)
EOSINOPHIL # BLD AUTO: 0.1 X10'3 (ref 0–0.9)
EOSINOPHIL NFR BLD AUTO: 0.4 % (ref 0–6)
ERYTHROCYTE [DISTWIDTH] IN BLOOD BY AUTOMATED COUNT: 18.3 % (ref 11.5–14.5)
GFR SERPL CREATININE-BSD FRML MDRD: 78 ML/MIN
GLUCOSE SERPL-MCNC: 142 MG/DL (ref 70–104)
HCT VFR BLD AUTO: 33.2 % (ref 42–52)
HGB BLD-MCNC: 11.5 G/DL (ref 14–17.9)
LYMPHOCYTES # BLD AUTO: 1.5 X10'3 (ref 1.1–4.8)
LYMPHOCYTES NFR BLD AUTO: 9.2 % (ref 21–51)
MAGNESIUM SERPL-MCNC: 2.2 MG/DL (ref 1.5–2.4)
MCH RBC QN AUTO: 34.5 PG (ref 27–31)
MCHC RBC AUTO-ENTMCNC: 34.6 G/DL (ref 33–36.5)
MCV RBC AUTO: 99.8 FL (ref 78–98)
MONOCYTES # BLD AUTO: 2 X10'3 (ref 0–0.9)
MONOCYTES NFR BLD AUTO: 12.2 % (ref 2–12)
NEUTROPHILS # BLD AUTO: 12.7 X10'3 (ref 1.8–7.7)
NEUTROPHILS NFR BLD AUTO: 77.9 % (ref 42–75)
PLATELET # BLD AUTO: 126 X10'3 (ref 140–440)
PMV BLD AUTO: 6.6 FL (ref 7.4–10.4)
POTASSIUM SERPL-SCNC: 3.8 MMOL/L (ref 3.5–5.1)
PROT SERPL-MCNC: 5.5 G/DL (ref 6.4–8.2)
RBC # BLD AUTO: 3.33 X10'6 (ref 4.7–6.1)
SODIUM SERPL-SCNC: 140 MMOL/L (ref 135–145)
TROPONIN I SERPL-MCNC: 0.09 NG/ML (ref 0–0.05)
TROPONIN I SERPL-MCNC: 0.09 NG/ML (ref 0–0.05)
WBC # BLD AUTO: 16.3 X10'3 (ref 4.5–11)

## 2021-07-21 RX ADMIN — INSULIN GLARGINE SCH UNIT: 100 INJECTION, SOLUTION SUBCUTANEOUS at 21:00

## 2021-07-21 RX ADMIN — SODIUM CHLORIDE SCH MLS/HR: 9 INJECTION INTRAMUSCULAR; INTRAVENOUS; SUBCUTANEOUS at 17:32

## 2021-07-21 RX ADMIN — METRONIDAZOLE SCH MLS/HR: 500 SOLUTION INTRAVENOUS at 17:41

## 2021-07-21 RX ADMIN — FUROSEMIDE SCH MG: 10 INJECTION, SOLUTION INTRAMUSCULAR; INTRAVENOUS at 20:05

## 2021-07-21 RX ADMIN — SODIUM CHLORIDE SCH GM: 0.9 IRRIGANT IRRIGATION at 20:05

## 2021-07-21 RX ADMIN — ALBUTEROL SULFATE SCH MG: 2.5 SOLUTION RESPIRATORY (INHALATION) at 08:23

## 2021-07-21 RX ADMIN — ALBUTEROL SULFATE SCH MG: 2.5 SOLUTION RESPIRATORY (INHALATION) at 20:35

## 2021-07-21 RX ADMIN — SODIUM CHLORIDE SCH GM: 0.9 IRRIGANT IRRIGATION at 14:05

## 2021-07-21 RX ADMIN — FUROSEMIDE SCH MG: 10 INJECTION, SOLUTION INTRAMUSCULAR; INTRAVENOUS at 08:13

## 2021-07-21 RX ADMIN — HEPARIN SODIUM SCH UNIT: 5000 INJECTION, SOLUTION INTRAVENOUS; SUBCUTANEOUS at 20:04

## 2021-07-21 RX ADMIN — CEFTRIAXONE SCH MLS/HR: 2 INJECTION, SOLUTION INTRAVENOUS at 09:56

## 2021-07-21 RX ADMIN — ALBUTEROL SULFATE SCH MG: 2.5 SOLUTION RESPIRATORY (INHALATION) at 14:33

## 2021-07-21 RX ADMIN — SODIUM CHLORIDE SCH MLS/HR: 9 INJECTION INTRAMUSCULAR; INTRAVENOUS; SUBCUTANEOUS at 17:46

## 2021-07-21 RX ADMIN — THERA TABS SCH EACH: TAB at 14:05

## 2021-07-21 RX ADMIN — HEPARIN SODIUM SCH UNIT: 5000 INJECTION, SOLUTION INTRAVENOUS; SUBCUTANEOUS at 08:13

## 2021-07-21 NOTE — NUR
Page Sent

  



PAGER ID:  [phone] 

MESSAGE:  [id number] Lori pt has a 3rd positive blood culture drawn from a separate site left 
arm. gram negative rods. thanks rell [phone]

## 2021-07-21 NOTE — NUR
came to nurses station concerned about a possible facial droop, performed 
neuro exam and an nih; no neurologic deficits NIH 0. Patients smile is symmetrical; does not 
have teeth in at baseline.

## 2021-07-21 NOTE — NUR
PT STATING HE HAS CHEST PAIN AND HOLDING HIS CHEST. 

PAGED DR. HOWARD REGARDING PT CONDITION AND REQUESTING ORDER FOR EKG AND TROPONIN 
BLOOD TEST. 

AWAITING RESPONSE FROM DR. HOWARD

## 2021-07-21 NOTE — NUR
DR. HOWARD CALLED REGARDING PAGE. 

I REQUESTED AN EKG FOR NEW CHEST PAIN IN PATIENT, SHE AGREED AND ASKED ME TO 
ORDER IT.

I REQUESTED A SERIES OF TROPONIN BLOOD TESTS IN PAGE AND SHE SAID SHE ORDERED 
SOME.

I REQUESTED A PBNP BLOOD TEST DUE TO LARGE AMOUNTS OF PITTING EDEMA TO PT LEGS 
UP TO GROIN, SHE AGREED AND HAD ME ORDER IT VERBAL ORDER.



UPON CHECKING THE LABS THAT HAVE BEEN ORDERED FOR THE PT I REALIZED THE 
TROPONINS HAD NOT BEEN ORDERED BY HER. 



I PUT IN THE ORDER FOR TROPONIN SERIES, EKG, AND PBNP.

## 2021-07-21 NOTE — NUR
Unable to obtain a medication list at this time. Pt aloc still and no 
demographic information or next of kin identified. Pharmacy contacted regarding 
issues and Pharmacist Lucia states to enter "unable to obtain home medications" 
in system for pt. She states pharmacist in AM will address medication list.

## 2021-07-21 NOTE — NUR
Discussed concerns of increased sacral, bilat LE edema and increased work of 
breathing w/ MD. Viet ORANTES to place orders.

## 2021-07-21 NOTE — NUR
DM Consult: Pt admit DX sepsis, hepatic encephalopathy, possible UTI, and 

T2DM A1C 8.3. Pt hx heavy etoh and positive for meth on admit per EMR. 

Receiving routine MVI; POLINA d/w RN regarding routine thiamin, folic if MD 

agreeable given etoh hx. Pt s/p CT abdomen/pelvis showing 

ascites,cirrhosis w/ moderate gas and stool in rectum per EMR. Ammonia 56 

on admit w/ lactulose ordered however pt NPO at this time pending SLP BSS 

w/ALOC. Will monitor for SLP recs, diet advancement, PO tolerance, and 

additional protein/kcal needs. Pt would benefit from DM ed once 

appropriate this admit given A1C.

Rec:

1. advance diet as medically indicated to heart healthy/carb controlled 

per SLP/MD recs

2. monitor for ONS needs

3. routine bowel care; lactulose Q6H per MD w/ ammonia 56 on admit

4. thiamin, folic, MVI for etoh

5. scaled wt this admit; subsequent weekly wts

6. DM ed once pt appropriate hx T2DM A1C 8.3; deferred at this time

-------------------------------------------------------------------------------

Addendum: 07/21/21 at 1622 by Dhaval Sears RD

-------------------------------------------------------------------------------

Amended: Links added.

## 2021-07-21 NOTE — NUR
Paracentesis ordred mid afternoon after AM heparin administration; rescheduled for tomorrow 
also patient was not alert enough to give consent.

## 2021-07-21 NOTE — NUR
In to do hourly rounds; patient was very warm to the touch. Checked his temperature orally 
and it was 102.7. Tylenol crushed and given in applesauce. Patient is very lethargic and can 
only open his eyes for brief periods of time. All other vital signs are within normal limits 
and urine output has been 2750 in just this shift alone from diuresis. Will continue to 
monitor.

## 2021-07-21 NOTE — NUR
PAGER ID:  [phone] 

MESSAGE:  [id number] AVELINO pt has been npo since last night, can we get a diet ordered for him? 
thanks rell [phone]

## 2021-07-21 NOTE — NUR
Problems reprioritized. Patient report given, questions answered & plan of care reviewed 
with Sohail KELLY.

## 2021-07-21 NOTE — NUR
PAGER ID:  [phone] 

MESSAGE:  ER patient AVELINO, has history of cirrhosis and takes lactulose at home, hasn't had 
an ammonia checked. Can I please add this to his labs? andre [phone]

## 2021-07-21 NOTE — NUR
Page Sent

 

PAGER ID:  [phone] 

MESSAGE:  [id number] B Lori, pt med rec has not been addressed. pt ammonia level is 56, do you 
want to give him some lactulose? rell [phone]

## 2021-07-22 VITALS — SYSTOLIC BLOOD PRESSURE: 164 MMHG | DIASTOLIC BLOOD PRESSURE: 82 MMHG

## 2021-07-22 VITALS — SYSTOLIC BLOOD PRESSURE: 160 MMHG | DIASTOLIC BLOOD PRESSURE: 83 MMHG

## 2021-07-22 VITALS — SYSTOLIC BLOOD PRESSURE: 170 MMHG | DIASTOLIC BLOOD PRESSURE: 83 MMHG

## 2021-07-22 VITALS — SYSTOLIC BLOOD PRESSURE: 129 MMHG | DIASTOLIC BLOOD PRESSURE: 105 MMHG

## 2021-07-22 VITALS — SYSTOLIC BLOOD PRESSURE: 158 MMHG | DIASTOLIC BLOOD PRESSURE: 78 MMHG

## 2021-07-22 LAB
ALBUMIN SERPL BCP-MCNC: 1.7 G/DL (ref 3.4–5)
ALBUMIN/GLOB SERPL: 0.5 {RATIO} (ref 1.1–1.5)
ALP SERPL-CCNC: 125 IU/L (ref 46–116)
ALT SERPL W P-5'-P-CCNC: 150 U/L (ref 12–78)
ANION GAP SERPL CALCULATED.3IONS-SCNC: 10 MMOL/L (ref 8–16)
ANISOCYTOSIS BLD QL SMEAR: (no result)
AST SERPL W P-5'-P-CCNC: 311 U/L (ref 10–37)
BASOPHILS # BLD AUTO: 0 X10'3 (ref 0–0.2)
BASOPHILS NFR BLD AUTO: 0.3 % (ref 0–1)
BILIRUB SERPL-MCNC: 2.6 MG/DL (ref 0.1–1)
BUN SERPL-MCNC: 27 MG/DL (ref 7–18)
BUN/CREAT SERPL: 28.7 (ref 5.4–32)
BURR CELLS BLD QL SMEAR: (no result)
CALCIUM SERPL-MCNC: 7.2 MG/DL (ref 8.5–10.1)
CHLORIDE SERPL-SCNC: 110 MMOL/L (ref 99–107)
CO2 SERPL-SCNC: 21.9 MMOL/L (ref 24–32)
CREAT SERPL-MCNC: 0.94 MG/DL (ref 0.6–1.1)
DACRYOCYTES BLD QL SMEAR: (no result)
EOSINOPHIL # BLD AUTO: 0.1 X10'3 (ref 0–0.9)
EOSINOPHIL NFR BLD AUTO: 0.7 % (ref 0–6)
ERYTHROCYTE [DISTWIDTH] IN BLOOD BY AUTOMATED COUNT: 19.1 % (ref 11.5–14.5)
GFR SERPL CREATININE-BSD FRML MDRD: 81 ML/MIN
GLUCOSE SERPL-MCNC: 175 MG/DL (ref 70–104)
HCT VFR BLD AUTO: 33.3 % (ref 42–52)
HGB BLD-MCNC: 11.4 G/DL (ref 14–17.9)
LYMPHOCYTES # BLD AUTO: 1.6 X10'3 (ref 1.1–4.8)
LYMPHOCYTES NFR BLD AUTO: 11.7 % (ref 21–51)
MACROCYTES BLD QL SMEAR: (no result)
MAGNESIUM SERPL-MCNC: 1.8 MG/DL (ref 1.5–2.4)
MCH RBC QN AUTO: 34.5 PG (ref 27–31)
MCHC RBC AUTO-ENTMCNC: 34.1 G/DL (ref 33–36.5)
MCV RBC AUTO: 101.1 FL (ref 78–98)
MONOCYTES # BLD AUTO: 2 X10'3 (ref 0–0.9)
MONOCYTES NFR BLD AUTO: 15.1 % (ref 2–12)
NEUTROPHILS # BLD AUTO: 9.6 X10'3 (ref 1.8–7.7)
NEUTROPHILS NFR BLD AUTO: 72.2 % (ref 42–75)
OVALOCYTES BLD QL SMEAR: (no result)
PLATELET # BLD AUTO: 100 X10'3 (ref 140–440)
PLATELET BLD QL SMEAR: (no result)
PMV BLD AUTO: 6.6 FL (ref 7.4–10.4)
POLYCHROMASIA BLD QL SMEAR: (no result)
POTASSIUM SERPL-SCNC: 3.9 MMOL/L (ref 3.5–5.1)
PROT SERPL-MCNC: 5.3 G/DL (ref 6.4–8.2)
RBC # BLD AUTO: 3.29 X10'6 (ref 4.7–6.1)
RBC MORPH BLD: (no result)
ROULEAUX BLD QL SMEAR: (no result)
SCHISTOCYTES BLD QL SMEAR: (no result)
SODIUM SERPL-SCNC: 142 MMOL/L (ref 135–145)
WBC # BLD AUTO: 13.3 X10'3 (ref 4.5–11)

## 2021-07-22 RX ADMIN — HEPARIN SODIUM SCH UNIT: 5000 INJECTION, SOLUTION INTRAVENOUS; SUBCUTANEOUS at 08:00

## 2021-07-22 RX ADMIN — HEPARIN SODIUM SCH UNIT: 5000 INJECTION, SOLUTION INTRAVENOUS; SUBCUTANEOUS at 20:00

## 2021-07-22 RX ADMIN — FUROSEMIDE SCH MG: 10 INJECTION, SOLUTION INTRAMUSCULAR; INTRAVENOUS at 19:59

## 2021-07-22 RX ADMIN — SODIUM CHLORIDE SCH GM: 0.9 IRRIGANT IRRIGATION at 19:59

## 2021-07-22 RX ADMIN — INSULIN LISPRO SCH UNITS: 100 INJECTION, SOLUTION INTRAVENOUS; SUBCUTANEOUS at 13:16

## 2021-07-22 RX ADMIN — METRONIDAZOLE SCH MLS/HR: 500 SOLUTION INTRAVENOUS at 01:00

## 2021-07-22 RX ADMIN — INSULIN LISPRO SCH UNITS: 100 INJECTION, SOLUTION INTRAVENOUS; SUBCUTANEOUS at 19:04

## 2021-07-22 RX ADMIN — FUROSEMIDE SCH MG: 10 INJECTION, SOLUTION INTRAMUSCULAR; INTRAVENOUS at 07:12

## 2021-07-22 RX ADMIN — METRONIDAZOLE SCH MLS/HR: 500 SOLUTION INTRAVENOUS at 07:08

## 2021-07-22 RX ADMIN — CEFTRIAXONE SCH MLS/HR: 2 INJECTION, SOLUTION INTRAVENOUS at 09:05

## 2021-07-22 RX ADMIN — SODIUM CHLORIDE SCH MLS/HR: 9 INJECTION INTRAMUSCULAR; INTRAVENOUS; SUBCUTANEOUS at 13:32

## 2021-07-22 RX ADMIN — ALBUTEROL SULFATE SCH MG: 2.5 SOLUTION RESPIRATORY (INHALATION) at 08:43

## 2021-07-22 RX ADMIN — METRONIDAZOLE SCH MLS/HR: 500 SOLUTION INTRAVENOUS at 15:59

## 2021-07-22 RX ADMIN — ALBUTEROL SULFATE SCH MG: 2.5 SOLUTION RESPIRATORY (INHALATION) at 20:01

## 2021-07-22 RX ADMIN — SODIUM CHLORIDE SCH GM: 0.9 IRRIGANT IRRIGATION at 07:08

## 2021-07-22 RX ADMIN — METRONIDAZOLE SCH MLS/HR: 500 SOLUTION INTRAVENOUS at 23:40

## 2021-07-22 RX ADMIN — SODIUM CHLORIDE SCH MLS/HR: 9 INJECTION INTRAMUSCULAR; INTRAVENOUS; SUBCUTANEOUS at 17:11

## 2021-07-22 RX ADMIN — INSULIN LISPRO SCH UNITS: 100 INJECTION, SOLUTION INTRAVENOUS; SUBCUTANEOUS at 09:07

## 2021-07-22 RX ADMIN — ALBUTEROL SULFATE SCH MG: 2.5 SOLUTION RESPIRATORY (INHALATION) at 15:27

## 2021-07-22 RX ADMIN — SODIUM CHLORIDE SCH GM: 0.9 IRRIGANT IRRIGATION at 15:57

## 2021-07-22 RX ADMIN — Medication SCH MMU: at 19:59

## 2021-07-22 RX ADMIN — INSULIN GLARGINE SCH UNIT: 100 INJECTION, SOLUTION SUBCUTANEOUS at 21:31

## 2021-07-22 RX ADMIN — ALBUTEROL SULFATE SCH MG: 2.5 SOLUTION RESPIRATORY (INHALATION) at 04:31

## 2021-07-22 RX ADMIN — SODIUM CHLORIDE SCH GM: 0.9 IRRIGANT IRRIGATION at 01:00

## 2021-07-22 RX ADMIN — THERA TABS SCH EACH: TAB at 07:08

## 2021-07-22 NOTE — NUR
PT agitated, pulling on tele. "i want to drink i need a drink, i'm freezing I'm so cold" 
noted pt hands shakey, sweat on brow - temp oral 99.8 - gave 1 blanket, repositioned. drink 
of water, and ativan. will cont to monitor

## 2021-07-22 NOTE — NUR
PAGER ID:  [phone] 

MESSAGE:  [id number], Snow angio can't do para until his LOC is better. Do you want us to give 
the heparin? platelets today were 100. [facility] [phone]

## 2021-07-22 NOTE — NUR
reported to days. noted pt resting w/o distress. anticipate paracentesis today. need to hold 
heparin this am. condom cath intact.

## 2021-07-22 NOTE — NUR
Patient in room ORTHO 4011. I have received report from STEPHANI KELLY   and had the opportunity 
to ask questions and assume patient care.

## 2021-07-22 NOTE — NUR
Patient in room ORTHO 4011. I have received report from ROBIN Madrid   and had the opportunity 
to ask questions and assume patient care.

## 2021-07-22 NOTE — NUR
Problems reprioritized. Patient report given, questions answered & plan of care reviewed 
with Arely KELLY.

## 2021-07-22 NOTE — NUR
Per angio they are not able to do paracentesis today due to the patients altered level of 
conciousness and he has no one to consent for him on his behalf.

## 2021-07-23 VITALS — SYSTOLIC BLOOD PRESSURE: 156 MMHG | DIASTOLIC BLOOD PRESSURE: 78 MMHG

## 2021-07-23 VITALS — DIASTOLIC BLOOD PRESSURE: 80 MMHG | SYSTOLIC BLOOD PRESSURE: 143 MMHG

## 2021-07-23 VITALS — SYSTOLIC BLOOD PRESSURE: 168 MMHG | DIASTOLIC BLOOD PRESSURE: 97 MMHG

## 2021-07-23 VITALS — DIASTOLIC BLOOD PRESSURE: 69 MMHG | SYSTOLIC BLOOD PRESSURE: 127 MMHG

## 2021-07-23 LAB
ALBUMIN SERPL BCP-MCNC: 1.7 G/DL (ref 3.4–5)
ALBUMIN/GLOB SERPL: 0.5 {RATIO} (ref 1.1–1.5)
ALP SERPL-CCNC: 129 IU/L (ref 46–116)
ALT SERPL W P-5'-P-CCNC: 142 U/L (ref 12–78)
ANION GAP SERPL CALCULATED.3IONS-SCNC: 8 MMOL/L (ref 8–16)
ANISOCYTOSIS BLD QL SMEAR: (no result)
AST SERPL W P-5'-P-CCNC: 194 U/L (ref 10–37)
BASOPHILS # BLD AUTO: 0.1 X10'3 (ref 0–0.2)
BASOPHILS NFR BLD AUTO: 0.4 % (ref 0–1)
BILIRUB SERPL-MCNC: 2.9 MG/DL (ref 0.1–1)
BUN SERPL-MCNC: 24 MG/DL (ref 7–18)
BUN/CREAT SERPL: 25 (ref 5.4–32)
BURR CELLS BLD QL SMEAR: (no result)
CALCIUM SERPL-MCNC: 7.7 MG/DL (ref 8.5–10.1)
CHLORIDE SERPL-SCNC: 109 MMOL/L (ref 99–107)
CO2 SERPL-SCNC: 26.7 MMOL/L (ref 24–32)
CREAT SERPL-MCNC: 0.96 MG/DL (ref 0.6–1.1)
EOSINOPHIL # BLD AUTO: 0.3 X10'3 (ref 0–0.9)
EOSINOPHIL NFR BLD AUTO: 2.1 % (ref 0–6)
EOSINOPHIL NFR BLD MANUAL: 4 % (ref 0–6)
ERYTHROCYTE [DISTWIDTH] IN BLOOD BY AUTOMATED COUNT: 19.4 % (ref 11.5–14.5)
GFR SERPL CREATININE-BSD FRML MDRD: 79 ML/MIN
GLUCOSE SERPL-MCNC: 152 MG/DL (ref 70–104)
HCT VFR BLD AUTO: 36.2 % (ref 42–52)
HGB BLD-MCNC: 12.4 G/DL (ref 14–17.9)
LYMPHOCYTES # BLD AUTO: 1.9 X10'3 (ref 1.1–4.8)
LYMPHOCYTES NFR BLD AUTO: 12.4 % (ref 21–51)
LYMPHOCYTES NFR BLD MANUAL: 13 % (ref 21–51)
MACROCYTES BLD QL SMEAR: (no result)
MAGNESIUM SERPL-MCNC: 1.8 MG/DL (ref 1.5–2.4)
MCH RBC QN AUTO: 34.3 PG (ref 27–31)
MCHC RBC AUTO-ENTMCNC: 34.3 G/DL (ref 33–36.5)
MCV RBC AUTO: 99.9 FL (ref 78–98)
MONOCYTES # BLD AUTO: 2.6 X10'3 (ref 0–0.9)
MONOCYTES NFR BLD AUTO: 16.9 % (ref 2–12)
MONOCYTES NFR BLD MANUAL: 15 % (ref 2–12)
NEUTROPHILS # BLD AUTO: 10.5 X10'3 (ref 1.8–7.7)
NEUTROPHILS NFR BLD AUTO: 68.2 % (ref 42–75)
NEUTS BAND # BLD MANUAL: 2 % (ref 0–10)
NEUTS SEG NFR BLD MANUAL: 66 % (ref 42–75)
OVALOCYTES BLD QL SMEAR: (no result)
PLATELET # BLD AUTO: 103 X10'3 (ref 140–440)
PLATELET BLD QL SMEAR: (no result)
PMV BLD AUTO: 6.9 FL (ref 7.4–10.4)
POLYCHROMASIA BLD QL SMEAR: (no result)
POTASSIUM SERPL-SCNC: 3.1 MMOL/L (ref 3.5–5.1)
PROT SERPL-MCNC: 5.4 G/DL (ref 6.4–8.2)
RBC # BLD AUTO: 3.62 X10'6 (ref 4.7–6.1)
RBC MORPH BLD: (no result)
SODIUM SERPL-SCNC: 144 MMOL/L (ref 135–145)
TOTAL CELLS COUNTED FLD: 100
WBC # BLD AUTO: 15.4 X10'3 (ref 4.5–11)

## 2021-07-23 RX ADMIN — POTASSIUM CHLORIDE PRN MEQ: 1500 TABLET, EXTENDED RELEASE ORAL at 16:35

## 2021-07-23 RX ADMIN — POTASSIUM CHLORIDE PRN MEQ: 1500 TABLET, EXTENDED RELEASE ORAL at 20:59

## 2021-07-23 RX ADMIN — FUROSEMIDE SCH MG: 10 INJECTION, SOLUTION INTRAMUSCULAR; INTRAVENOUS at 10:50

## 2021-07-23 RX ADMIN — INSULIN LISPRO SCH UNITS: 100 INJECTION, SOLUTION INTRAVENOUS; SUBCUTANEOUS at 19:41

## 2021-07-23 RX ADMIN — THERA TABS SCH EACH: TAB at 08:18

## 2021-07-23 RX ADMIN — FUROSEMIDE SCH MG: 10 INJECTION, SOLUTION INTRAMUSCULAR; INTRAVENOUS at 08:19

## 2021-07-23 RX ADMIN — SODIUM CHLORIDE SCH GM: 0.9 IRRIGANT IRRIGATION at 16:34

## 2021-07-23 RX ADMIN — Medication SCH MMU: at 08:19

## 2021-07-23 RX ADMIN — ALBUTEROL SULFATE SCH MG: 2.5 SOLUTION RESPIRATORY (INHALATION) at 09:37

## 2021-07-23 RX ADMIN — CEFTRIAXONE SCH MLS/HR: 2 INJECTION, SOLUTION INTRAVENOUS at 08:22

## 2021-07-23 RX ADMIN — SODIUM CHLORIDE SCH GM: 0.9 IRRIGANT IRRIGATION at 02:07

## 2021-07-23 RX ADMIN — HEPARIN SODIUM SCH UNIT: 5000 INJECTION, SOLUTION INTRAVENOUS; SUBCUTANEOUS at 20:00

## 2021-07-23 RX ADMIN — POTASSIUM CHLORIDE PRN MEQ: 1500 TABLET, EXTENDED RELEASE ORAL at 10:55

## 2021-07-23 RX ADMIN — Medication SCH MMU: at 19:46

## 2021-07-23 RX ADMIN — INSULIN GLARGINE SCH UNIT: 100 INJECTION, SOLUTION SUBCUTANEOUS at 21:08

## 2021-07-23 RX ADMIN — SODIUM CHLORIDE SCH GM: 0.9 IRRIGANT IRRIGATION at 19:46

## 2021-07-23 RX ADMIN — SODIUM CHLORIDE SCH GM: 0.9 IRRIGANT IRRIGATION at 08:17

## 2021-07-23 RX ADMIN — METRONIDAZOLE SCH MLS/HR: 500 SOLUTION INTRAVENOUS at 08:21

## 2021-07-23 RX ADMIN — ALBUTEROL SULFATE SCH MG: 2.5 SOLUTION RESPIRATORY (INHALATION) at 19:58

## 2021-07-23 RX ADMIN — ALBUTEROL SULFATE SCH MG: 2.5 SOLUTION RESPIRATORY (INHALATION) at 15:43

## 2021-07-23 RX ADMIN — INSULIN LISPRO SCH UNITS: 100 INJECTION, SOLUTION INTRAVENOUS; SUBCUTANEOUS at 10:58

## 2021-07-23 RX ADMIN — HEPARIN SODIUM SCH UNIT: 5000 INJECTION, SOLUTION INTRAVENOUS; SUBCUTANEOUS at 08:00

## 2021-07-23 RX ADMIN — ALBUTEROL SULFATE SCH MG: 2.5 SOLUTION RESPIRATORY (INHALATION) at 02:55

## 2021-07-23 NOTE — NUR
PAGER ID:  [phone] 

MESSAGE:  [id number]: Butch Sandoval: Ammonia is 17, PLT is 103, would you like me to give heparin 
dose? -re [phone]

## 2021-07-23 NOTE — NUR
Patient in room ORTHO 4011. I have received report from ROBIN Ulloa   and had the opportunity to 
ask questions and assume patient care.

## 2021-07-23 NOTE — NUR
Problems reprioritized. Patient report given, questions answered & plan of care reviewed 
with ROBIN Ulloa.

## 2021-07-23 NOTE — NUR
Patient in room ORTHO 4011. I have received report from ROBIN Mcgee and had the opportunity to 
ask questions and assume patient care.

## 2021-07-23 NOTE — NUR
Spoke to pts SUNSHINE, which is is son, Meng. Gave permission to give information to pts 
ex-wife, Ute. Updated her on the patient. She's wanted to find placement for Mr. Sandoval as 
she says he has been declining cognitively. Will reach out to  regarding the 
issue. Ute stated that pts family and kids dont want to have anything to do with pt.

## 2021-07-23 NOTE — NUR
Problems reprioritized. Patient report given, questions answered & plan of care reviewed 
with ROBIN Mcgee.

## 2021-07-24 VITALS — SYSTOLIC BLOOD PRESSURE: 157 MMHG | DIASTOLIC BLOOD PRESSURE: 88 MMHG

## 2021-07-24 VITALS — DIASTOLIC BLOOD PRESSURE: 90 MMHG | SYSTOLIC BLOOD PRESSURE: 170 MMHG

## 2021-07-24 VITALS — SYSTOLIC BLOOD PRESSURE: 173 MMHG | DIASTOLIC BLOOD PRESSURE: 82 MMHG

## 2021-07-24 VITALS — DIASTOLIC BLOOD PRESSURE: 90 MMHG | SYSTOLIC BLOOD PRESSURE: 159 MMHG

## 2021-07-24 LAB
ALBUMIN SERPL BCP-MCNC: 1.9 G/DL (ref 3.4–5)
ALBUMIN/GLOB SERPL: 0.5 {RATIO} (ref 1.1–1.5)
ALP SERPL-CCNC: 159 IU/L (ref 46–116)
ALT SERPL W P-5'-P-CCNC: 137 U/L (ref 12–78)
ANION GAP SERPL CALCULATED.3IONS-SCNC: 11 MMOL/L (ref 8–16)
AST SERPL W P-5'-P-CCNC: 144 U/L (ref 10–37)
BASOPHILS # BLD AUTO: 0.1 X10'3 (ref 0–0.2)
BASOPHILS NFR BLD AUTO: 0.7 % (ref 0–1)
BILIRUB SERPL-MCNC: 3.3 MG/DL (ref 0.1–1)
BUN SERPL-MCNC: 23 MG/DL (ref 7–18)
BUN/CREAT SERPL: 21.7 (ref 5.4–32)
CALCIUM SERPL-MCNC: 8 MG/DL (ref 8.5–10.1)
CHLORIDE SERPL-SCNC: 103 MMOL/L (ref 99–107)
CO2 SERPL-SCNC: 27.6 MMOL/L (ref 24–32)
CREAT SERPL-MCNC: 1.06 MG/DL (ref 0.6–1.1)
EOSINOPHIL # BLD AUTO: 0.5 X10'3 (ref 0–0.9)
EOSINOPHIL NFR BLD AUTO: 4.2 % (ref 0–6)
ERYTHROCYTE [DISTWIDTH] IN BLOOD BY AUTOMATED COUNT: 19 % (ref 11.5–14.5)
GFR SERPL CREATININE-BSD FRML MDRD: 71 ML/MIN
GLUCOSE SERPL-MCNC: 186 MG/DL (ref 70–104)
HCT VFR BLD AUTO: 35.8 % (ref 42–52)
HGB BLD-MCNC: 12.3 G/DL (ref 14–17.9)
LYMPHOCYTES # BLD AUTO: 1.5 X10'3 (ref 1.1–4.8)
LYMPHOCYTES NFR BLD AUTO: 12.8 % (ref 21–51)
MAGNESIUM SERPL-MCNC: 1.5 MG/DL (ref 1.5–2.4)
MCH RBC QN AUTO: 34.1 PG (ref 27–31)
MCHC RBC AUTO-ENTMCNC: 34.4 G/DL (ref 33–36.5)
MCV RBC AUTO: 99.2 FL (ref 78–98)
MONOCYTES # BLD AUTO: 1.9 X10'3 (ref 0–0.9)
MONOCYTES NFR BLD AUTO: 16.3 % (ref 2–12)
NEUTROPHILS # BLD AUTO: 7.8 X10'3 (ref 1.8–7.7)
NEUTROPHILS NFR BLD AUTO: 66 % (ref 42–75)
PLATELET # BLD AUTO: 114 X10'3 (ref 140–440)
PMV BLD AUTO: 6.6 FL (ref 7.4–10.4)
POTASSIUM SERPL-SCNC: 3.1 MMOL/L (ref 3.5–5.1)
PROT SERPL-MCNC: 5.9 G/DL (ref 6.4–8.2)
RBC # BLD AUTO: 3.6 X10'6 (ref 4.7–6.1)
SODIUM SERPL-SCNC: 142 MMOL/L (ref 135–145)
WBC # BLD AUTO: 11.8 X10'3 (ref 4.5–11)

## 2021-07-24 RX ADMIN — SODIUM CHLORIDE SCH GM: 0.9 IRRIGANT IRRIGATION at 02:24

## 2021-07-24 RX ADMIN — ALBUTEROL SULFATE SCH MG: 2.5 SOLUTION RESPIRATORY (INHALATION) at 21:43

## 2021-07-24 RX ADMIN — SODIUM CHLORIDE SCH GM: 0.9 IRRIGANT IRRIGATION at 07:45

## 2021-07-24 RX ADMIN — POTASSIUM CHLORIDE PRN MEQ: 1500 TABLET, EXTENDED RELEASE ORAL at 20:43

## 2021-07-24 RX ADMIN — CEFTRIAXONE SCH MLS/HR: 2 INJECTION, SOLUTION INTRAVENOUS at 08:00

## 2021-07-24 RX ADMIN — HEPARIN SODIUM SCH UNIT: 5000 INJECTION, SOLUTION INTRAVENOUS; SUBCUTANEOUS at 20:00

## 2021-07-24 RX ADMIN — ALBUTEROL SULFATE SCH MG: 2.5 SOLUTION RESPIRATORY (INHALATION) at 08:55

## 2021-07-24 RX ADMIN — INSULIN LISPRO SCH UNITS: 100 INJECTION, SOLUTION INTRAVENOUS; SUBCUTANEOUS at 14:36

## 2021-07-24 RX ADMIN — SODIUM CHLORIDE SCH MLS/HR: 9 INJECTION INTRAMUSCULAR; INTRAVENOUS; SUBCUTANEOUS at 05:32

## 2021-07-24 RX ADMIN — INSULIN LISPRO SCH UNITS: 100 INJECTION, SOLUTION INTRAVENOUS; SUBCUTANEOUS at 19:04

## 2021-07-24 RX ADMIN — SODIUM CHLORIDE SCH GM: 0.9 IRRIGANT IRRIGATION at 20:00

## 2021-07-24 RX ADMIN — Medication SCH MMU: at 20:43

## 2021-07-24 RX ADMIN — POTASSIUM CHLORIDE PRN MEQ: 1500 TABLET, EXTENDED RELEASE ORAL at 14:48

## 2021-07-24 RX ADMIN — HEPARIN SODIUM SCH UNIT: 5000 INJECTION, SOLUTION INTRAVENOUS; SUBCUTANEOUS at 08:00

## 2021-07-24 RX ADMIN — SODIUM CHLORIDE SCH GM: 0.9 IRRIGANT IRRIGATION at 14:35

## 2021-07-24 RX ADMIN — INSULIN GLARGINE SCH UNIT: 100 INJECTION, SOLUTION SUBCUTANEOUS at 21:48

## 2021-07-24 RX ADMIN — ALBUTEROL SULFATE SCH MG: 2.5 SOLUTION RESPIRATORY (INHALATION) at 03:17

## 2021-07-24 RX ADMIN — ALBUTEROL SULFATE SCH MG: 2.5 SOLUTION RESPIRATORY (INHALATION) at 16:08

## 2021-07-24 RX ADMIN — THERA TABS SCH EACH: TAB at 07:46

## 2021-07-24 RX ADMIN — Medication SCH MMU: at 07:46

## 2021-07-24 NOTE — NUR
PAGER ID:  [phone] 

MESSAGE:  [id number]: Snow - Pt line blew, may I reorder the Rocephin IM? -re [phone]

## 2021-07-24 NOTE — NUR
PAGER ID:  [phone] 

MESSAGE:  [id number]: Snow - Pt had output of 800cc, input of 2kcc, bladder scan of 200cc, would 
you like pt on fluid restriction? Also, may I place pt on CC diet? -re [phone]

## 2021-07-24 NOTE — NUR
PAGER ID:  [phone] 

MESSAGE:  [id number]: Lori - Was able to get PIV placed, would you still like to give Rocephin IM 
-re [phone]

## 2021-07-24 NOTE — NUR
Problems reprioritized. Patient report given, questions answered & plan of care reviewed 
with ROBIN Miranda.

## 2021-07-24 NOTE — NUR
Problems reprioritized. Patient report given, questions answered & plan of care reviewed 
with ROBIN Kraft.

## 2021-07-24 NOTE — NUR
Patient in room ORTHO 4011. I have received report from  Laila KELLY  and had the opportunity to 
ask questions and assume patient care.

## 2021-07-24 NOTE — NUR
Patient projectile vomited coffee ground emesis. No IV accesses due to hard stick, day shift 
MD aware. Called night time MD and received many new orders.

## 2021-07-25 VITALS — DIASTOLIC BLOOD PRESSURE: 83 MMHG | SYSTOLIC BLOOD PRESSURE: 157 MMHG

## 2021-07-25 VITALS — SYSTOLIC BLOOD PRESSURE: 166 MMHG | DIASTOLIC BLOOD PRESSURE: 83 MMHG

## 2021-07-25 VITALS — SYSTOLIC BLOOD PRESSURE: 161 MMHG | DIASTOLIC BLOOD PRESSURE: 78 MMHG

## 2021-07-25 LAB
ALBUMIN SERPL BCP-MCNC: 2.5 G/DL (ref 3.4–5)
ALBUMIN/GLOB SERPL: 0.8 {RATIO} (ref 1.1–1.5)
ALP SERPL-CCNC: 118 IU/L (ref 46–116)
ALT SERPL W P-5'-P-CCNC: 95 U/L (ref 12–78)
ANION GAP SERPL CALCULATED.3IONS-SCNC: 11 MMOL/L (ref 8–16)
ANISOCYTOSIS BLD QL SMEAR: (no result)
AST SERPL W P-5'-P-CCNC: 75 U/L (ref 10–37)
BASOPHILS # BLD AUTO: 0 X10'3 (ref 0–0.2)
BASOPHILS # BLD AUTO: 0 X10'3 (ref 0–0.2)
BASOPHILS NFR BLD AUTO: 0.3 % (ref 0–1)
BASOPHILS NFR BLD AUTO: 0.4 % (ref 0–1)
BILIRUB SERPL-MCNC: 3.3 MG/DL (ref 0.1–1)
BUN SERPL-MCNC: 31 MG/DL (ref 7–18)
BUN/CREAT SERPL: 28.7 (ref 5.4–32)
BURR CELLS BLD QL SMEAR: (no result)
CALCIUM SERPL-MCNC: 7.9 MG/DL (ref 8.5–10.1)
CHLORIDE SERPL-SCNC: 101 MMOL/L (ref 99–107)
CO2 SERPL-SCNC: 27.2 MMOL/L (ref 24–32)
CREAT SERPL-MCNC: 1.08 MG/DL (ref 0.6–1.1)
DACRYOCYTES BLD QL SMEAR: (no result)
EOSINOPHIL # BLD AUTO: 0.3 X10'3 (ref 0–0.9)
EOSINOPHIL # BLD AUTO: 0.3 X10'3 (ref 0–0.9)
EOSINOPHIL NFR BLD AUTO: 2.2 % (ref 0–6)
EOSINOPHIL NFR BLD AUTO: 2.4 % (ref 0–6)
ERYTHROCYTE [DISTWIDTH] IN BLOOD BY AUTOMATED COUNT: 19.2 % (ref 11.5–14.5)
ERYTHROCYTE [DISTWIDTH] IN BLOOD BY AUTOMATED COUNT: 19.3 % (ref 11.5–14.5)
GFR SERPL CREATININE-BSD FRML MDRD: 69 ML/MIN
GLUCOSE SERPL-MCNC: 191 MG/DL (ref 70–104)
HCT VFR BLD AUTO: 31.2 % (ref 42–52)
HCT VFR BLD AUTO: 37.9 % (ref 42–52)
HGB BLD-MCNC: 10.6 G/DL (ref 14–17.9)
HGB BLD-MCNC: 13.1 G/DL (ref 14–17.9)
LYMPHOCYTES # BLD AUTO: 1.6 X10'3 (ref 1.1–4.8)
LYMPHOCYTES # BLD AUTO: 1.7 X10'3 (ref 1.1–4.8)
LYMPHOCYTES NFR BLD AUTO: 12 % (ref 21–51)
LYMPHOCYTES NFR BLD AUTO: 13.6 % (ref 21–51)
MACROCYTES BLD QL SMEAR: (no result)
MAGNESIUM SERPL-MCNC: 1.5 MG/DL (ref 1.5–2.4)
MCH RBC QN AUTO: 34.6 PG (ref 27–31)
MCH RBC QN AUTO: 34.7 PG (ref 27–31)
MCHC RBC AUTO-ENTMCNC: 34.1 G/DL (ref 33–36.5)
MCHC RBC AUTO-ENTMCNC: 34.6 G/DL (ref 33–36.5)
MCV RBC AUTO: 100.2 FL (ref 78–98)
MCV RBC AUTO: 101.4 FL (ref 78–98)
MONOCYTES # BLD AUTO: 1.7 X10'3 (ref 0–0.9)
MONOCYTES # BLD AUTO: 1.8 X10'3 (ref 0–0.9)
MONOCYTES NFR BLD AUTO: 12.8 % (ref 2–12)
MONOCYTES NFR BLD AUTO: 14.7 % (ref 2–12)
NEUTROPHILS # BLD AUTO: 8.5 X10'3 (ref 1.8–7.7)
NEUTROPHILS # BLD AUTO: 9.8 X10'3 (ref 1.8–7.7)
NEUTROPHILS NFR BLD AUTO: 68.9 % (ref 42–75)
NEUTROPHILS NFR BLD AUTO: 72.7 % (ref 42–75)
OVALOCYTES BLD QL SMEAR: (no result)
PLATELET # BLD AUTO: 138 X10'3 (ref 140–440)
PLATELET # BLD AUTO: 142 X10'3 (ref 140–440)
PLATELET BLD QL SMEAR: NORMAL
PMV BLD AUTO: 7.2 FL (ref 7.4–10.4)
PMV BLD AUTO: 7.3 FL (ref 7.4–10.4)
POLYCHROMASIA BLD QL SMEAR: (no result)
POTASSIUM SERPL-SCNC: 3.5 MMOL/L (ref 3.5–5.1)
PROT SERPL-MCNC: 5.6 G/DL (ref 6.4–8.2)
RBC # BLD AUTO: 3.07 X10'6 (ref 4.7–6.1)
RBC # BLD AUTO: 3.78 X10'6 (ref 4.7–6.1)
RBC MORPH BLD: (no result)
SODIUM SERPL-SCNC: 139 MMOL/L (ref 135–145)
WBC # BLD AUTO: 12.3 X10'3 (ref 4.5–11)
WBC # BLD AUTO: 13.5 X10'3 (ref 4.5–11)

## 2021-07-25 PROCEDURE — 05HY33Z INSERTION OF INFUSION DEVICE INTO UPPER VEIN, PERCUTANEOUS APPROACH: ICD-10-PCS | Performed by: EMERGENCY MEDICINE

## 2021-07-25 PROCEDURE — B54MZZA ULTRASONOGRAPHY OF RIGHT UPPER EXTREMITY VEINS, GUIDANCE: ICD-10-PCS | Performed by: EMERGENCY MEDICINE

## 2021-07-25 RX ADMIN — OCTREOTIDE ACETATE SCH MLS/HR: 200 INJECTION, SOLUTION INTRAVENOUS; SUBCUTANEOUS at 19:55

## 2021-07-25 RX ADMIN — SODIUM CHLORIDE SCH GM: 0.9 IRRIGANT IRRIGATION at 20:00

## 2021-07-25 RX ADMIN — ALBUTEROL SULFATE SCH MG: 2.5 SOLUTION RESPIRATORY (INHALATION) at 15:55

## 2021-07-25 RX ADMIN — Medication SCH MMU: at 09:47

## 2021-07-25 RX ADMIN — THERA TABS SCH EACH: TAB at 09:43

## 2021-07-25 RX ADMIN — POTASSIUM CHLORIDE PRN MEQ: 1500 TABLET, EXTENDED RELEASE ORAL at 04:05

## 2021-07-25 RX ADMIN — INSULIN LISPRO SCH UNITS: 100 INJECTION, SOLUTION INTRAVENOUS; SUBCUTANEOUS at 09:54

## 2021-07-25 RX ADMIN — SODIUM CHLORIDE SCH MLS/HR: 9 INJECTION INTRAMUSCULAR; INTRAVENOUS; SUBCUTANEOUS at 20:55

## 2021-07-25 RX ADMIN — ONDANSETRON PRN MG: 4 TABLET, ORALLY DISINTEGRATING ORAL at 00:14

## 2021-07-25 RX ADMIN — SODIUM CHLORIDE SCH GM: 0.9 IRRIGANT IRRIGATION at 14:00

## 2021-07-25 RX ADMIN — SODIUM CHLORIDE SCH MLS/HR: 9 INJECTION INTRAMUSCULAR; INTRAVENOUS; SUBCUTANEOUS at 01:32

## 2021-07-25 RX ADMIN — SODIUM CHLORIDE SCH MLS/HR: 9 INJECTION INTRAMUSCULAR; INTRAVENOUS; SUBCUTANEOUS at 03:00

## 2021-07-25 RX ADMIN — SODIUM CHLORIDE SCH MLS/HR: 9 INJECTION INTRAMUSCULAR; INTRAVENOUS; SUBCUTANEOUS at 09:55

## 2021-07-25 RX ADMIN — SODIUM CHLORIDE SCH GM: 0.9 IRRIGANT IRRIGATION at 19:26

## 2021-07-25 RX ADMIN — PANTOPRAZOLE SODIUM SCH MG: 40 TABLET, DELAYED RELEASE ORAL at 19:26

## 2021-07-25 RX ADMIN — INSULIN GLARGINE SCH UNIT: 100 INJECTION, SOLUTION SUBCUTANEOUS at 21:55

## 2021-07-25 RX ADMIN — SODIUM CHLORIDE SCH GM: 0.9 IRRIGANT IRRIGATION at 04:06

## 2021-07-25 RX ADMIN — ALBUTEROL SULFATE SCH MG: 2.5 SOLUTION RESPIRATORY (INHALATION) at 21:52

## 2021-07-25 RX ADMIN — OCTREOTIDE ACETATE SCH MLS/HR: 200 INJECTION, SOLUTION INTRAVENOUS; SUBCUTANEOUS at 03:09

## 2021-07-25 RX ADMIN — SODIUM CHLORIDE SCH GM: 0.9 IRRIGANT IRRIGATION at 08:00

## 2021-07-25 RX ADMIN — INSULIN LISPRO SCH UNITS: 100 INJECTION, SOLUTION INTRAVENOUS; SUBCUTANEOUS at 19:26

## 2021-07-25 RX ADMIN — SODIUM CHLORIDE SCH MLS/HR: 9 INJECTION INTRAMUSCULAR; INTRAVENOUS; SUBCUTANEOUS at 21:32

## 2021-07-25 RX ADMIN — CEFTRIAXONE SODIUM SCH MG: 1 INJECTION, POWDER, FOR SOLUTION INTRAMUSCULAR; INTRAVENOUS at 11:06

## 2021-07-25 RX ADMIN — Medication SCH MMU: at 19:26

## 2021-07-25 RX ADMIN — ALBUTEROL SULFATE SCH MG: 2.5 SOLUTION RESPIRATORY (INHALATION) at 02:36

## 2021-07-25 RX ADMIN — INSULIN LISPRO SCH UNITS: 100 INJECTION, SOLUTION INTRAVENOUS; SUBCUTANEOUS at 14:34

## 2021-07-25 RX ADMIN — ALBUTEROL SULFATE SCH MG: 2.5 SOLUTION RESPIRATORY (INHALATION) at 08:12

## 2021-07-25 RX ADMIN — PANTOPRAZOLE SODIUM SCH MG: 40 TABLET, DELAYED RELEASE ORAL at 09:42

## 2021-07-25 NOTE — NUR
Patient in room ORTHO 4011. I have received report from Ruth KELLY and had the opportunity to 
ask questions and assume patient care.

## 2021-07-25 NOTE — NUR
Reassessment: Pt remains ALOC AOx1 w/ PO remaining poor 0-25% carb 

controlled/pureed/thin diet w/ feeder per SLP recs past 3 days not meeting 

needs. Possible coffee ground emesis this AM started on protonix per MD 

note. LBM 7/24 receiving routine lactulose though has refused past 3 doses 

per EMR. Poor nutrition status day 5 given initial 2 days of NPO this 

admit. IF poor PO persists w/ ALOC consider alternative nutrition to meet 

pt needs IF within patients plan of care.

Rec:

1. continue carb controlled/pureed/thin diet w/ feeder per SLP/MD recs

2. Consider Ensure Enlive TIDWM once PO acceptance improves

3. routine bowel care; lactulose Q6H per MD w/ ammonia 56 on admit

4. thiamin, folic, MVI for etoh

5. scaled wt this admit; subsequent weekly wts

6. DM ed once pt appropriate hx T2DM A1C 8.3; deferred at this time

-------------------------------------------------------------------------------

Addendum: 07/25/21 at 1056 by Dhaval Sears RD

-------------------------------------------------------------------------------

Amended: Links added.

-------------------------------------------------------------------------------

Addendum: 07/25/21 at 1057 by Dhaval Sears RD

-------------------------------------------------------------------------------

Rec:

1. continue carb controlled/pureed/thin diet w/ feeder per SLP/MD recs

2. Consider Ensure Enlive TIDWM once PO acceptance improves

3. routine bowel care; lactulose Q6H per MD w/ ammonia 56 on admit

4. thiamin, folic, MVI for etoh

5. scaled wt this admit; subsequent weekly wts

6. IF poor PO persists w/ ALOC; consider EN via NG to meet nutrition needs IF within 
patient's plan of care

## 2021-07-25 NOTE — NUR
Patient pulled out central line, tip intact verified by two nurses. Pressure dressing 
applied. MD called and aware.

## 2021-07-25 NOTE — NUR
Problems reprioritized. Patient report given, questions answered & plan of care reviewed 
with Sonam KELLY.

## 2021-07-25 NOTE — NUR
Patient in room ORTHO 4020. I have received report from MARCO KELLY and had the opportunity to 
ask questions and assume patient care.

## 2021-07-26 VITALS — DIASTOLIC BLOOD PRESSURE: 73 MMHG | SYSTOLIC BLOOD PRESSURE: 128 MMHG

## 2021-07-26 VITALS — DIASTOLIC BLOOD PRESSURE: 84 MMHG | SYSTOLIC BLOOD PRESSURE: 143 MMHG

## 2021-07-26 VITALS — SYSTOLIC BLOOD PRESSURE: 138 MMHG | DIASTOLIC BLOOD PRESSURE: 71 MMHG

## 2021-07-26 VITALS — SYSTOLIC BLOOD PRESSURE: 147 MMHG | DIASTOLIC BLOOD PRESSURE: 81 MMHG

## 2021-07-26 LAB
ALBUMIN SERPL BCP-MCNC: 2.3 G/DL (ref 3.4–5)
ALBUMIN/GLOB SERPL: 0.7 {RATIO} (ref 1.1–1.5)
ALP SERPL-CCNC: 110 IU/L (ref 46–116)
ALT SERPL W P-5'-P-CCNC: 85 U/L (ref 12–78)
ANION GAP SERPL CALCULATED.3IONS-SCNC: 6 MMOL/L (ref 8–16)
AST SERPL W P-5'-P-CCNC: 70 U/L (ref 10–37)
BASOPHILS # BLD AUTO: 0.1 X10'3 (ref 0–0.2)
BASOPHILS NFR BLD AUTO: 0.4 % (ref 0–1)
BILIRUB SERPL-MCNC: 3.6 MG/DL (ref 0.1–1)
BUN SERPL-MCNC: 30 MG/DL (ref 7–18)
BUN/CREAT SERPL: 27.3 (ref 5.4–32)
BURR CELLS BLD QL SMEAR: (no result)
CALCIUM SERPL-MCNC: 8.7 MG/DL (ref 8.5–10.1)
CHLORIDE SERPL-SCNC: 98 MMOL/L (ref 99–107)
CO2 SERPL-SCNC: 30.6 MMOL/L (ref 24–32)
CREAT SERPL-MCNC: 1.1 MG/DL (ref 0.6–1.1)
EOSINOPHIL # BLD AUTO: 0.4 X10'3 (ref 0–0.9)
EOSINOPHIL NFR BLD AUTO: 2.9 % (ref 0–6)
EOSINOPHIL NFR BLD MANUAL: 3 % (ref 0–6)
ERYTHROCYTE [DISTWIDTH] IN BLOOD BY AUTOMATED COUNT: 19.2 % (ref 11.5–14.5)
GFR SERPL CREATININE-BSD FRML MDRD: 68 ML/MIN
GLUCOSE SERPL-MCNC: 176 MG/DL (ref 70–104)
HCT VFR BLD AUTO: 33.7 % (ref 42–52)
HGB BLD-MCNC: 11.4 G/DL (ref 14–17.9)
LYMPHOCYTES # BLD AUTO: 1.9 X10'3 (ref 1.1–4.8)
LYMPHOCYTES NFR BLD AUTO: 13 % (ref 21–51)
LYMPHOCYTES NFR BLD MANUAL: 25 % (ref 21–51)
MAGNESIUM SERPL-MCNC: 1.4 MG/DL (ref 1.5–2.4)
MCH RBC QN AUTO: 34.7 PG (ref 27–31)
MCHC RBC AUTO-ENTMCNC: 33.9 G/DL (ref 33–36.5)
MCV RBC AUTO: 102.4 FL (ref 78–98)
MONOCYTES # BLD AUTO: 2.1 X10'3 (ref 0–0.9)
MONOCYTES NFR BLD AUTO: 14.7 % (ref 2–12)
MONOCYTES NFR BLD MANUAL: 12 % (ref 2–12)
NEUTROPHILS # BLD AUTO: 9.8 X10'3 (ref 1.8–7.7)
NEUTROPHILS NFR BLD AUTO: 69 % (ref 42–75)
NEUTS SEG NFR BLD MANUAL: 60 % (ref 42–75)
OVALOCYTES BLD QL SMEAR: (no result)
PLATELET # BLD AUTO: 170 X10'3 (ref 140–440)
PLATELET BLD QL SMEAR: NORMAL
PMV BLD AUTO: 7.5 FL (ref 7.4–10.4)
POLYCHROMASIA BLD QL SMEAR: (no result)
POTASSIUM SERPL-SCNC: 3.1 MMOL/L (ref 3.5–5.1)
PROT SERPL-MCNC: 5.6 G/DL (ref 6.4–8.2)
RBC # BLD AUTO: 3.29 X10'6 (ref 4.7–6.1)
RBC MORPH BLD: (no result)
SCHISTOCYTES BLD QL SMEAR: (no result)
SMUDGE CELLS BLD QL SMEAR: (no result)
SODIUM SERPL-SCNC: 135 MMOL/L (ref 135–145)
TOTAL CELLS COUNTED FLD: 100
WBC # BLD AUTO: 14.2 X10'3 (ref 4.5–11)

## 2021-07-26 RX ADMIN — SODIUM CHLORIDE SCH MLS/HR: 9 INJECTION INTRAMUSCULAR; INTRAVENOUS; SUBCUTANEOUS at 05:55

## 2021-07-26 RX ADMIN — SODIUM CHLORIDE SCH GM: 0.9 IRRIGANT IRRIGATION at 08:43

## 2021-07-26 RX ADMIN — Medication PRN MG: at 08:43

## 2021-07-26 RX ADMIN — Medication PRN MG: at 13:18

## 2021-07-26 RX ADMIN — METOPROLOL TARTRATE SCH MG: 25 TABLET, FILM COATED ORAL at 21:03

## 2021-07-26 RX ADMIN — CEFTRIAXONE SODIUM SCH MG: 1 INJECTION, POWDER, FOR SOLUTION INTRAMUSCULAR; INTRAVENOUS at 08:44

## 2021-07-26 RX ADMIN — INSULIN LISPRO SCH UNITS: 100 INJECTION, SOLUTION INTRAVENOUS; SUBCUTANEOUS at 09:32

## 2021-07-26 RX ADMIN — ONDANSETRON PRN MG: 4 TABLET, ORALLY DISINTEGRATING ORAL at 23:07

## 2021-07-26 RX ADMIN — OCTREOTIDE ACETATE SCH MLS/HR: 200 INJECTION, SOLUTION INTRAVENOUS; SUBCUTANEOUS at 15:55

## 2021-07-26 RX ADMIN — Medication SCH MMU: at 20:59

## 2021-07-26 RX ADMIN — POTASSIUM CHLORIDE PRN MEQ: 1500 TABLET, EXTENDED RELEASE ORAL at 17:42

## 2021-07-26 RX ADMIN — SODIUM CHLORIDE SCH MLS/HR: 9 INJECTION INTRAMUSCULAR; INTRAVENOUS; SUBCUTANEOUS at 17:32

## 2021-07-26 RX ADMIN — POTASSIUM CHLORIDE PRN MEQ: 1500 TABLET, EXTENDED RELEASE ORAL at 13:17

## 2021-07-26 RX ADMIN — ALBUTEROL SULFATE SCH MG: 2.5 SOLUTION RESPIRATORY (INHALATION) at 02:28

## 2021-07-26 RX ADMIN — PANTOPRAZOLE SODIUM SCH MG: 40 TABLET, DELAYED RELEASE ORAL at 08:42

## 2021-07-26 RX ADMIN — ALBUTEROL SULFATE SCH MG: 2.5 SOLUTION RESPIRATORY (INHALATION) at 09:04

## 2021-07-26 RX ADMIN — INSULIN LISPRO SCH UNITS: 100 INJECTION, SOLUTION INTRAVENOUS; SUBCUTANEOUS at 13:22

## 2021-07-26 RX ADMIN — ALBUTEROL SULFATE SCH MG: 2.5 SOLUTION RESPIRATORY (INHALATION) at 15:33

## 2021-07-26 RX ADMIN — ALBUTEROL SULFATE SCH MG: 2.5 SOLUTION RESPIRATORY (INHALATION) at 20:05

## 2021-07-26 RX ADMIN — SODIUM CHLORIDE SCH GM: 0.9 IRRIGANT IRRIGATION at 20:00

## 2021-07-26 RX ADMIN — INSULIN LISPRO SCH UNITS: 100 INJECTION, SOLUTION INTRAVENOUS; SUBCUTANEOUS at 18:49

## 2021-07-26 RX ADMIN — Medication SCH MMU: at 08:42

## 2021-07-26 RX ADMIN — SODIUM CHLORIDE SCH GM: 0.9 IRRIGANT IRRIGATION at 13:18

## 2021-07-26 RX ADMIN — SODIUM CHLORIDE SCH GM: 0.9 IRRIGANT IRRIGATION at 02:02

## 2021-07-26 RX ADMIN — PANTOPRAZOLE SODIUM SCH MG: 40 TABLET, DELAYED RELEASE ORAL at 20:59

## 2021-07-26 RX ADMIN — SODIUM CHLORIDE SCH MLS/HR: 9 INJECTION INTRAMUSCULAR; INTRAVENOUS; SUBCUTANEOUS at 15:55

## 2021-07-26 RX ADMIN — INSULIN GLARGINE SCH UNIT: 100 INJECTION, SOLUTION SUBCUTANEOUS at 20:58

## 2021-07-26 RX ADMIN — THERA TABS SCH EACH: TAB at 08:42

## 2021-07-26 RX ADMIN — POTASSIUM CHLORIDE PRN MEQ: 1500 TABLET, EXTENDED RELEASE ORAL at 21:19

## 2021-07-26 RX ADMIN — METOPROLOL TARTRATE SCH MG: 25 TABLET, FILM COATED ORAL at 09:35

## 2021-07-26 NOTE — NUR
Page Sent



PAGER ID:  [phone] 

MESSAGE:  DAYANA [phone] RE: JOHAN YOU [id number] CAN WE D/C PT'S SITTER? THANKS!

## 2021-07-26 NOTE — NUR
Problems reprioritized. Patient report given, questions answered & plan of care reviewed 
with ROBIN JAIMES.

## 2021-07-26 NOTE — NUR
Problems reprioritized. Patient report given, questions answered & plan of care reviewed 
with DAYANA KELLY.

## 2021-07-26 NOTE — NUR
Patient in room ORTHO 4020B. I have received report from  ROBIN YOUSIF  and had the opportunity 
to ask questions and assume patient care.

## 2021-07-27 VITALS — SYSTOLIC BLOOD PRESSURE: 129 MMHG | DIASTOLIC BLOOD PRESSURE: 67 MMHG

## 2021-07-27 VITALS — SYSTOLIC BLOOD PRESSURE: 129 MMHG

## 2021-07-27 LAB
ALBUMIN SERPL BCP-MCNC: 2.3 G/DL (ref 3.4–5)
ALBUMIN/GLOB SERPL: 0.6 {RATIO} (ref 1.1–1.5)
ALP SERPL-CCNC: 123 IU/L (ref 46–116)
ALT SERPL W P-5'-P-CCNC: 76 U/L (ref 12–78)
ANION GAP SERPL CALCULATED.3IONS-SCNC: 8 MMOL/L (ref 8–16)
AST SERPL W P-5'-P-CCNC: 66 U/L (ref 10–37)
BASOPHILS # BLD AUTO: 0.1 X10'3 (ref 0–0.2)
BASOPHILS NFR BLD AUTO: 0.6 % (ref 0–1)
BILIRUB SERPL-MCNC: 3.5 MG/DL (ref 0.1–1)
BUN SERPL-MCNC: 34 MG/DL (ref 7–18)
BUN/CREAT SERPL: 25 (ref 5.4–32)
CALCIUM SERPL-MCNC: 8.7 MG/DL (ref 8.5–10.1)
CHLORIDE SERPL-SCNC: 98 MMOL/L (ref 99–107)
CO2 SERPL-SCNC: 30.6 MMOL/L (ref 24–32)
CREAT SERPL-MCNC: 1.36 MG/DL (ref 0.6–1.1)
EOSINOPHIL # BLD AUTO: 0.5 X10'3 (ref 0–0.9)
EOSINOPHIL NFR BLD AUTO: 3.4 % (ref 0–6)
ERYTHROCYTE [DISTWIDTH] IN BLOOD BY AUTOMATED COUNT: 19.4 % (ref 11.5–14.5)
GFR SERPL CREATININE-BSD FRML MDRD: 53 ML/MIN
GLUCOSE SERPL-MCNC: 166 MG/DL (ref 70–104)
HCT VFR BLD AUTO: 35.4 % (ref 42–52)
HGB BLD-MCNC: 12.4 G/DL (ref 14–17.9)
LYMPHOCYTES # BLD AUTO: 2.2 X10'3 (ref 1.1–4.8)
LYMPHOCYTES NFR BLD AUTO: 15.3 % (ref 21–51)
MAGNESIUM SERPL-MCNC: 1.6 MG/DL (ref 1.5–2.4)
MCH RBC QN AUTO: 35.1 PG (ref 27–31)
MCHC RBC AUTO-ENTMCNC: 35 G/DL (ref 33–36.5)
MCV RBC AUTO: 100.2 FL (ref 78–98)
MONOCYTES NFR BLD AUTO: 14.9 % (ref 2–12)
NEUTROPHILS # BLD AUTO: 9.5 X10'3 (ref 1.8–7.7)
NEUTROPHILS NFR BLD AUTO: 65.8 % (ref 42–75)
PLATELET # BLD AUTO: 210 X10'3 (ref 140–440)
PMV BLD AUTO: 7.2 FL (ref 7.4–10.4)
POTASSIUM SERPL-SCNC: 3.4 MMOL/L (ref 3.5–5.1)
PROT SERPL-MCNC: 5.9 G/DL (ref 6.4–8.2)
RBC # BLD AUTO: 3.53 X10'6 (ref 4.7–6.1)
SODIUM SERPL-SCNC: 137 MMOL/L (ref 135–145)
WBC # BLD AUTO: 14.4 X10'3 (ref 4.5–11)

## 2021-07-27 RX ADMIN — ALBUTEROL SULFATE SCH MG: 2.5 SOLUTION RESPIRATORY (INHALATION) at 08:27

## 2021-07-27 RX ADMIN — METOPROLOL TARTRATE SCH MG: 25 TABLET, FILM COATED ORAL at 09:02

## 2021-07-27 RX ADMIN — SODIUM CHLORIDE SCH GM: 0.9 IRRIGANT IRRIGATION at 08:00

## 2021-07-27 RX ADMIN — Medication SCH MMU: at 09:02

## 2021-07-27 RX ADMIN — SODIUM CHLORIDE SCH GM: 0.9 IRRIGANT IRRIGATION at 02:27

## 2021-07-27 RX ADMIN — INSULIN LISPRO SCH UNITS: 100 INJECTION, SOLUTION INTRAVENOUS; SUBCUTANEOUS at 08:58

## 2021-07-27 RX ADMIN — PANTOPRAZOLE SODIUM SCH MG: 40 TABLET, DELAYED RELEASE ORAL at 09:02

## 2021-07-27 RX ADMIN — SODIUM CHLORIDE SCH MLS/HR: 9 INJECTION INTRAMUSCULAR; INTRAVENOUS; SUBCUTANEOUS at 01:55

## 2021-07-27 RX ADMIN — CEFTRIAXONE SODIUM SCH MG: 1 INJECTION, POWDER, FOR SOLUTION INTRAMUSCULAR; INTRAVENOUS at 09:06

## 2021-07-27 RX ADMIN — THERA TABS SCH EACH: TAB at 09:02

## 2021-07-27 RX ADMIN — ALBUTEROL SULFATE SCH MG: 2.5 SOLUTION RESPIRATORY (INHALATION) at 02:15

## 2021-07-27 NOTE — NUR
Page Sent



PAGER ID:  [phone] 

MESSAGE:  DAYANA [phone] RE: JOHAN YOU [id number] PT IS DECIDING TO LEAVE A

## 2021-07-27 NOTE — NUR
Page Sent



PAGER ID:  [phone] 

MESSAGE:  DAYANA [id number] RE: JOHAN YOU [id number] PER PHYSICAL THERAPY, PT IS UNSAFE TO GO HOME. 
THEY SAID WOULD BENEFIT FROM MORE PHYSICAL THERAPY.

## 2021-07-27 NOTE — NUR
PT LEFT AMA. PT BELONGINGS GATHERED AND SENT WITH PT. PT LEFT FOR HOME IN PRIVATE VEHICLE 
ACCOMPANIED BY FAMILY MEMBER. PT LEFT FLOOR AT 1515.

## 2021-07-27 NOTE — NUR
Patient in room ORTHO 4020B. I have received report from  ROBIN JAIMES  and had the 
opportunity to ask questions and assume patient care.

## 2021-07-28 LAB — MONOCYTES # BLD AUTO: 2.2 X10'3 (ref 0–0.9)

## 2024-08-21 NOTE — NUR
Patient in room PCU 3013. I have received report from Bertha KELLY and had the opportunity to 
ask questions and assume patient care. Patient: Rosio Cunha    Procedure: Procedure(s):  RE-EXCISION OF SHOULDER SARCOMA       Anesthesia Type:  General    Note:  Disposition: Outpatient   Postop Pain Control: Uneventful            Sign Out: Well controlled pain   PONV: No   Neuro/Psych: Uneventful            Sign Out: Acceptable/Baseline neuro status   Airway/Respiratory: Uneventful            Sign Out: Acceptable/Baseline resp. status   CV/Hemodynamics: Uneventful            Sign Out: Acceptable CV status; No obvious hypovolemia; No obvious fluid overload   Other NRE: NONE   DID A NON-ROUTINE EVENT OCCUR?            Last vitals:  Vitals Value Taken Time   /72 08/21/24 1615   Temp 36.3  C (97.4  F) 08/21/24 1545   Pulse 57 08/21/24 1619   Resp 12 08/21/24 1619   SpO2 96 % 08/21/24 1619   Vitals shown include unfiled device data.    Electronically Signed By: Zaira Mishra MD  August 21, 2024  4:19 PM
